# Patient Record
Sex: MALE | Race: WHITE
[De-identification: names, ages, dates, MRNs, and addresses within clinical notes are randomized per-mention and may not be internally consistent; named-entity substitution may affect disease eponyms.]

---

## 2019-05-30 ENCOUNTER — HOSPITAL ENCOUNTER (INPATIENT)
Dept: HOSPITAL 47 - EC | Age: 69
LOS: 6 days | Discharge: ANOTHER HOSPITAL | DRG: 659 | End: 2019-06-05
Payer: MEDICARE

## 2019-05-30 VITALS — BODY MASS INDEX: 17.4 KG/M2

## 2019-05-30 DIAGNOSIS — A41.9: ICD-10-CM

## 2019-05-30 DIAGNOSIS — J44.9: ICD-10-CM

## 2019-05-30 DIAGNOSIS — M19.90: ICD-10-CM

## 2019-05-30 DIAGNOSIS — E87.5: ICD-10-CM

## 2019-05-30 DIAGNOSIS — N18.4: ICD-10-CM

## 2019-05-30 DIAGNOSIS — N32.3: ICD-10-CM

## 2019-05-30 DIAGNOSIS — J96.12: ICD-10-CM

## 2019-05-30 DIAGNOSIS — J96.11: ICD-10-CM

## 2019-05-30 DIAGNOSIS — Z79.899: ICD-10-CM

## 2019-05-30 DIAGNOSIS — Z88.0: ICD-10-CM

## 2019-05-30 DIAGNOSIS — N17.0: ICD-10-CM

## 2019-05-30 DIAGNOSIS — F79: ICD-10-CM

## 2019-05-30 DIAGNOSIS — E87.2: ICD-10-CM

## 2019-05-30 DIAGNOSIS — N13.2: Primary | ICD-10-CM

## 2019-05-30 DIAGNOSIS — Q67.5: ICD-10-CM

## 2019-05-30 DIAGNOSIS — Z87.442: ICD-10-CM

## 2019-05-30 DIAGNOSIS — Z88.5: ICD-10-CM

## 2019-05-30 DIAGNOSIS — I46.9: ICD-10-CM

## 2019-05-30 DIAGNOSIS — J69.0: ICD-10-CM

## 2019-05-30 PROCEDURE — 96374 THER/PROPH/DIAG INJ IV PUSH: CPT

## 2019-05-30 PROCEDURE — 80053 COMPREHEN METABOLIC PANEL: CPT

## 2019-05-30 PROCEDURE — 84132 ASSAY OF SERUM POTASSIUM: CPT

## 2019-05-30 PROCEDURE — 94760 N-INVAS EAR/PLS OXIMETRY 1: CPT

## 2019-05-30 PROCEDURE — 87086 URINE CULTURE/COLONY COUNT: CPT

## 2019-05-30 PROCEDURE — 99285 EMERGENCY DEPT VISIT HI MDM: CPT

## 2019-05-30 PROCEDURE — 76770 US EXAM ABDO BACK WALL COMP: CPT

## 2019-05-30 PROCEDURE — 71045 X-RAY EXAM CHEST 1 VIEW: CPT

## 2019-05-30 PROCEDURE — 85025 COMPLETE CBC W/AUTO DIFF WBC: CPT

## 2019-05-30 PROCEDURE — 74420 UROGRAPHY RTRGR +-KUB: CPT

## 2019-05-30 PROCEDURE — 82553 CREATINE MB FRACTION: CPT

## 2019-05-30 PROCEDURE — 82805 BLOOD GASES W/O2 SATURATION: CPT

## 2019-05-30 PROCEDURE — 84100 ASSAY OF PHOSPHORUS: CPT

## 2019-05-30 PROCEDURE — 82365 CALCULUS SPECTROSCOPY: CPT

## 2019-05-30 PROCEDURE — 96375 TX/PRO/DX INJ NEW DRUG ADDON: CPT

## 2019-05-30 PROCEDURE — 81001 URINALYSIS AUTO W/SCOPE: CPT

## 2019-05-30 PROCEDURE — 84484 ASSAY OF TROPONIN QUANT: CPT

## 2019-05-30 PROCEDURE — 85610 PROTHROMBIN TIME: CPT

## 2019-05-30 PROCEDURE — 36600 WITHDRAWAL OF ARTERIAL BLOOD: CPT

## 2019-05-30 PROCEDURE — 83735 ASSAY OF MAGNESIUM: CPT

## 2019-05-30 PROCEDURE — 94002 VENT MGMT INPAT INIT DAY: CPT

## 2019-05-30 PROCEDURE — 80048 BASIC METABOLIC PNL TOTAL CA: CPT

## 2019-05-30 PROCEDURE — 96361 HYDRATE IV INFUSION ADD-ON: CPT

## 2019-05-30 PROCEDURE — 93005 ELECTROCARDIOGRAM TRACING: CPT

## 2019-05-30 PROCEDURE — 85730 THROMBOPLASTIN TIME PARTIAL: CPT

## 2019-05-30 PROCEDURE — 74018 RADEX ABDOMEN 1 VIEW: CPT

## 2019-05-30 NOTE — ED
Abdominal Pain HPI





- General


Chief Complaint: Abdominal Pain


Stated Complaint: kidney stones


Time Seen by Provider: 05/30/19 19:39


Source: patient


Mode of arrival: wheelchair


Limitations: no limitations





- History of Present Illness


Initial Comments: 


68-year-old male patient presents to the emergency department today for 

evaluation of increased left flank pain and nausea.  Patient states he woke 

around 0500 this morning with severe sharp stabbing left flank pain.  Patient 

states that he was taken by ambulance to [facility], had labs and

CT scan performed and was diagnosed with kidney stone.  Patient states he was 

discharged back to the nursing facility where he resides, states that he was 

pain-free for several hours however the pain started again.  Patient states he 

was given Norco however did not seem to relieve his symptoms.  Patient comes in 

requesting fentanyl.  Patient states he is urinating without difficulty.  Denies

any hematuria.  Denies any fever or chills.  Patient states he did have a kidney

stone one time in the past in 1988. Patient denies any recent rash, shortness 

breath, chest pain, diarrhea, constipation, back pain, numbness, tingling, 

dizziness, weakness, headache, visual changes, or any other complaints.








- Related Data


                                Home Medications











 Medication  Instructions  Recorded  Confirmed


 


Acetaminophen [Tylenol] 650 mg PO Q6H PRN 05/30/19 05/30/19


 


Cyanocobalamin (Vitamin B-12) 1,000 mcg PO DAILY 05/30/19 05/30/19





[Vitamin B-12]   


 


Ferrous Sulfate [Iron (65  mg PO DAILY 05/30/19 05/30/19





Elemental)]   


 


Folic Acid 0.4 mg PO DAILY@0800 05/30/19 05/30/19


 


HYDROcodone/APAP 5-325MG [Norco 1 - 2 tab PO Q4H PRN 05/30/19 05/30/19





5-325]   


 


Ondansetron HCl [Zofran] 8 mg PO Q8H PRN 05/30/19 05/30/19


 


Tamsulosin [Flomax] 0.4 mg PO DAILY PRN 05/30/19 05/30/19








                                  Previous Rx's











 Medication  Instructions  Recorded


 


Ondansetron [Zofran ODT] 4 mg PO Q8HR PRN #10 tab 05/30/19











                                    Allergies











Allergy/AdvReac Type Severity Reaction Status Date / Time


 


Penicillins Allergy  Unknown Verified 05/30/19 19:47





   Childhood  


 


morphine AdvReac  Nausea & Verified 05/30/19 19:47





   Vomiting  














Review of Systems


ROS Statement: 


Those systems with pertinent positive or pertinent negative responses have been 

documented in the HPI.





ROS Other: All systems not noted in ROS Statement are negative.





Past Medical History


Additional Past Medical History / Comment(s): arthritis. scoliosis


History of Any Multi-Drug Resistant Organisms: None Reported


Past Surgical History: Hernia Repair


Past Psychological History: No Psychological Hx Reported


Smoking Status: Never smoker


Past Alcohol Use History: Occasional


Past Drug Use History: None Reported





General Exam


Limitations: no limitations


General appearance: alert, in no apparent distress, other (Physical well-

developed, well-nourished adult male patient who exhibits severe scoliosis.  

Vital signs upon presentation are temperature 98.2F, pulse 87, respirations 20,

blood pressure 109/59, pulse ox 90% on room air.)


Eye exam: Present: normal appearance, PERRL, EOMI.  Absent: scleral icterus, 

conjunctival injection, periorbital swelling


ENT exam: Present: normal exam, normal oropharynx, mucous membranes moist


Respiratory exam: Present: normal lung sounds bilaterally.  Absent: respiratory 

distress, wheezes, rales, rhonchi, stridor


Cardiovascular Exam: Present: regular rate, normal rhythm, normal heart sounds. 

Absent: systolic murmur, diastolic murmur, rubs, gallop, clicks


GI/Abdominal exam: Present: soft, normal bowel sounds.  Absent: distended, 

tenderness, guarding, rebound, rigid


Neurological exam: Present: alert, oriented X3, CN II-XII intact


Psychiatric exam: Present: normal affect, normal mood


Skin exam: Present: warm, dry, intact, normal color.  Absent: rash





Course


                                   Vital Signs











  05/30/19





  19:18


 


Temperature 98.0 F


 


Pulse Rate 87


 


Respiratory 20





Rate 


 


Blood Pressure 109/59


 


O2 Sat by Pulse 90 L





Oximetry 














Medical Decision Making





- Medical Decision Making


68-year-old male patient coming from [facility] for evaluation of intractable 

left flank pain.  Patient was diagnosed with kidney stone this morning.  Was 

seen and evaluated at [facility] and discharge.  Dr. Franco the

patient's physician wanted him transferred here for admission for intractable 

pain.  Patient was given medication in the emergency department, he does 

currently report improvement of symptoms.  He'll be admitted for observation.





Report was reviewed from [facility] urinalysis shows negative 

glucose, 1+ protein, negative bilirubin, less than 2 urobilinogen, 5 pH, 3+ 

blood, negative ketones, negative nitrite, negative leukocyte esterase, cloudy 

clarity, normal specific gravity, and yellow color.  Patient did have greater 

than 20 red blood cells.  White blood cell count is 12.1.  BUN 25, creatinine 

1.4.  CT of the abdomen and pelvis was obtained.  Showed an obstructing 4 mm 

proximal left ureter calculus causing mild to moderate left-sided 

hydronephrosis.














Disposition


Clinical Impression: 


 Kidney stone on left side





Disposition: ADMITTED AS IP TO THIS [facility]


Condition: Serious


Additional Instructions: 


Increase fluids.  Continue medications as directed.  Follow-up with the primary 

care physician for recheck in 1-2 days.  Return to the emergency department 

immediately for any new, worsening, or concerning symptoms.


Prescriptions: 


Ondansetron [Zofran ODT] 4 mg PO Q8HR PRN #10 tab


 PRN Reason: Nausea


Referrals: 


Frank Franco MD [Primary Care Provider] - 1-2 days


Decision to Admit Reason: Admit from EC


Decision Date: 05/30/19


Decision Time: 21:20

## 2019-05-31 LAB
ALBUMIN SERPL-MCNC: 3.6 G/DL (ref 3.5–5)
ALP SERPL-CCNC: 65 U/L (ref 38–126)
ALT SERPL-CCNC: 19 U/L (ref 21–72)
ANION GAP SERPL CALC-SCNC: 7 MMOL/L
AST SERPL-CCNC: 19 U/L (ref 17–59)
BUN SERPL-SCNC: 40 MG/DL (ref 9–20)
CALCIUM SPEC-MCNC: 8.7 MG/DL (ref 8.4–10.2)
CHLORIDE SERPL-SCNC: 108 MMOL/L (ref 98–107)
CO2 SERPL-SCNC: 28 MMOL/L (ref 22–30)
GLUCOSE SERPL-MCNC: 104 MG/DL (ref 74–99)
PH UR: 5 [PH] (ref 5–8)
POTASSIUM SERPL-SCNC: 5 MMOL/L (ref 3.5–5.1)
PROT SERPL-MCNC: 6.1 G/DL (ref 6.3–8.2)
RBC UR QL: >182 /HPF (ref 0–5)
SODIUM SERPL-SCNC: 143 MMOL/L (ref 137–145)
SP GR UR: 1.01 (ref 1–1.03)
SQUAMOUS UR QL AUTO: 1 /HPF (ref 0–4)
UROBILINOGEN UR QL STRIP: <2 MG/DL (ref ?–2)
WBC #/AREA URNS HPF: 26 /HPF (ref 0–5)

## 2019-05-31 RX ADMIN — CYANOCOBALAMIN TAB 500 MCG SCH MCG: 500 TAB at 08:14

## 2019-05-31 RX ADMIN — TAMSULOSIN HYDROCHLORIDE PRN MG: 0.4 CAPSULE ORAL at 08:14

## 2019-05-31 RX ADMIN — Medication SCH MG: at 08:14

## 2019-05-31 RX ADMIN — FOLIC ACID SCH MG: 1 TABLET ORAL at 08:14

## 2019-05-31 RX ADMIN — CEFAZOLIN SCH: 330 INJECTION, POWDER, FOR SOLUTION INTRAMUSCULAR; INTRAVENOUS at 00:15

## 2019-05-31 RX ADMIN — CEFAZOLIN SCH: 330 INJECTION, POWDER, FOR SOLUTION INTRAMUSCULAR; INTRAVENOUS at 08:27

## 2019-05-31 NOTE — US
EXAMINATION TYPE: US kidneys/renal and bladder

 

DATE OF EXAM: 5/31/2019

 

COMPARISON: KUB

 

CLINICAL HISTORY: elroy.; marked underlying rotary scoliosis distorting normal anatomy making evaluatio
n suboptimal; left ureteral calculus per KUB today; patient sat upright for exam per his verbal prefe
rence.

 

EXAM MEASUREMENTS:

 

Right Kidney:  assessed in multiple views and not seen due to above clinical history

Left Kidney: 9.8 x 6.7 x 6.0 cm

 

 

 

Right Kidney: not seen due to anatomical limitations and rib shadowing  

Left Kidney: mild hydronephrosis, mid pole hyperechoic foci with posterior shadowing = 0.7 x 1.0 x 0.
3cm; mid pole cortical cyst = 0.8 x 0.7 x 0.8cm   

Bladder: not assessed as patient unable to lay supinely

 

 

 

 

IMPRESSION:

Right kidney is not identified. Left kidney shows some hydronephrosis and calcification that could re
late to obstruction at the ureteropelvic junction. Urinary bladder not evaluated.

## 2019-05-31 NOTE — XR
EXAMINATION TYPE: XR KUB

 

DATE OF EXAM: 5/31/2019 1:53 PM

 

CLINICAL HISTORY:  Left-sided kidney pain.

 

TECHNIQUE: Single supine KUB image of the abdomen is obtained.

 

COMPARISON: None.

 

FINDINGS: There is marked underlying rotary scoliosis distorting normal anatomy making evaluation sub
optimal. Rounded densities in the pelvis favor bilateral phleboliths. There is 4 mm round density samantha
r level of left superior sacroiliac joint could reflect mid ureter calculus.

 

There is overall nonobstructive bowel gas pattern. Visualized lung bases are clear.

 

IMPRESSION: Possible 4 mm mid left ureter calculus.

## 2019-05-31 NOTE — HP
HISTORY AND PHYSICAL



Dr. Kolb is covering for Dr. Frank Franco.



DATE OF SERVICE:

2019



HISTORY OF PRESENT ILLNESS:

Patient is a 68-year-old male who started to experience worsening left flank pain with

associated nausea yesterday at the nursing home where he resides, which is [facility].  The patient was taken to [facility] and evaluated in the

emergency room and discharged.  Patient continued to experience this extreme pain and

was told he had a slight fever when he was at [facility].  The patient contacted his

primary care physician and was told to go to [facility] Emergency Room to be admitted with

an acute kidney stone.



PAST MEDICAL HISTORY:

Past medical history is significant for arthritis and severe scoliosis.



PAST SURGICAL HISTORY:

Past surgical history is significant for hernia repair and tonsillectomy.



ALLERGIES:

ALLERGIES INCLUDE PENICILLIN and MORPHINE.



MEDICATIONS:

Medications patient is on at [facility] include:

1. Tylenol 650 mg p.o. q.6 hours p.r.n.

2. Vitamin B12 1000 mcg p.o. daily.

3. Iron 325 mg p.o. daily.

4. Folic acid 0.4 mg p.o. daily.

5. Norco 5/325 one to two tablets p.o. q.4 hours p.r.n.

6. Zofran 8 mg p.o. q.8 hours p.r.n.

7. Flomax 0.4 mg p.o. daily.



FAMILY HISTORY:

Father  in his 80s of some type of complications.  Mother  at the age of 91

from old age.



SOCIAL HISTORY:

No history of smoking.  Occasional alcohol, glass of wine. Patient did work in a

factory.



REVIEW OF SYSTEMS:

GENERAL: Negative for any fever.  Patient did complain of chills and did state that he

was told he had a slight fever at [facility].  Patient's weight is stable.

HEENT: Denies any headaches.  Denies any vision changes.  Does have some difficulty

hearing.  Denies any sore throat or difficulty swallowing.

RESPIRATORY: Positive for shortness of breath at times.  Patient states the biggest

thing is that he tires very easily.

CARDIOVASCULAR:  Negative for chest pain.

GI:  Negative for any stomach pain, although the patient does have significant lower

left quadrant pain that radiates up to the back at this time.

:  Again positive for kidney stone with significant pain on that left side.

ENDOCRINE: Negative for any diabetes mellitus or thyroid disease.

MUSCULOSKELETAL:  Positive for severe scoliosis.  NEUROLOGIC: Negative for seizures or

CVA.

PSYCHIATRIC:  Negative for anxiety.  Patient said a kidney stone is depressing in and

of itself.



PHYSICAL EXAMINATION:

GENERAL: Pleasant 68-year-old male who is sitting up at the bedside and does exhibit

severe scoliosis.

VITAL SIGNS: Temperature is 98.5, heart rate 86, respiratory rate 18, blood pressure

122/71. Oxygen saturation is 95% on nasal cannula at 2 L.

HEENT: Head is normocephalic, atraumatic.  Patient would not let me assess his pupils.

Ears and nose: No discharge was noted.  Mouth: Very difficult for patient to open up

his mouth.  Mucous membranes appear moist.  The patient does have poor dental hygiene.

LUNGS:  Sounds diminished.

HEART:  S1 and S2 are heard.  Not tachycardic.

ABDOMEN:  Soft.  Pain on that left side.

EXTREMITIES:  No edema.

NEUROLOGIC: Patient is awake, alert, oriented.



LABS:

Sodium is 143, potassium 5.0, chloride 108. CO2 is 28. Anion gap is 7. BUN is 40,

creatinine 2.94. Glucose is 104, calcium 8.7, total bilirubin 0.6, AST 19, ALT 19,

alkaline phosphatase 65, total protein 6.1, albumin 3.6.  Urinalysis: trace protein,

moderate blood, moderate leukocyte esterase, greater than 182 red blood cells, 26 white

blood cells.



IMAGING:

Ultrasound of the kidneys and bladder: Right kidney is not identified.  Left kidney

shows some hydronephrosis and calcification that could relate to obstruction at the

ureteropelvic junction.  Urinary bladder is not evaluated. KUB shows a possible 4 mm

mid left ureter calculus.



IMPRESSION:

1. Left ureter calculus.

2. Left hydronephrosis.

3. Acute kidney injury.



PLAN:

Consult Urology. Consult Nephrology. GI and DVT prophylaxis. Pain control.  Home

medications.



Again, this patient is being seen by Dr. Aicha Kolb covering for Dr. Frank Franco.





MMODL / IJN: [id number] / Job#: [id number]

## 2019-05-31 NOTE — P.NPCON
History of Present Illness





- Reason for Consult


acute renal failure





- History of Present Illness





Reason for consultation: Acute kidney injury





History of present illness:


Patient is a 68-year-old male seen in consultation for acute kidney injury.  

Unclear as to what his baseline renal function is.  Creatinine on admission was 

2.94.  From the ER notes it appears patient's creatinine was 1.4 when he was at 

[facility] yesterday.  Patient states he went to [facility] yesterday 

due to flank pain.  Patient was noted to have kidney stones.  He was given pain 

medication and subsequently discharged back to [facility].  However

yesterday evening he developed recurrent flank pain and was brought to [facility].  Patient had a KUB x-ray done this morning which revealed a

possible 4 mm left ureteral calculus.  His pain is better controlled.  Patient 

of  good urine output.  No hematuria or dysuria.  Patient had an episode of 

vomiting last night but none today.  Oral intake is good.  He is maintained on 

IV fluids.  Patient denies use of nonsteroidals.  He denies any family history 

of renal disease.  Hemodynamically stable.  No fever or chills.  





Vital signs are stable.


General: The patient appeared well nourished and normally developed. 


HEENT: Head exam is unremarkable. Neck is without jugular venous distension.


LUNGS: Breath sounds decreased.


HEART: Rate and Rhythm are regular. First and second heart sounds normal. No 

murmurs, rubs or gallops. 


ABDOMEN: Abdominal exam reveals normal bowel sounds. Non-tender and non-

distended. No evidence of peritonitis.


EXTREMITITES: No clubbing, cyanosis, or edema.





Past Medical History


Additional Past Medical History / Comment(s): arthritis. scoliosis


History of Any Multi-Drug Resistant Organisms: None Reported


Past Surgical History: Hernia Repair


Past Psychological History: No Psychological Hx Reported


Smoking Status: Never smoker


Past Alcohol Use History: Occasional


Past Drug Use History: None Reported





- Past Family History


  ** Mother


History Unknown: Yes





Medications and Allergies


                                Home Medications











 Medication  Instructions  Recorded  Confirmed  Type


 


Acetaminophen [Tylenol] 650 mg PO Q6H PRN 05/30/19 05/30/19 History


 


Cyanocobalamin (Vitamin B-12) 1,000 mcg PO DAILY 05/30/19 05/30/19 History





[Vitamin B-12]    


 


Ferrous Sulfate [Iron (65  mg PO DAILY 05/30/19 05/30/19 History





Elemental)]    


 


Folic Acid 0.4 mg PO DAILY@0800 05/30/19 05/30/19 History


 


HYDROcodone/APAP 5-325MG [Norco 1 - 2 tab PO Q4H PRN 05/30/19 05/30/19 History





5-325]    


 


Ondansetron HCl [Zofran] 8 mg PO Q8H PRN 05/30/19 05/30/19 History


 


Ondansetron [Zofran ODT] 4 mg PO Q8HR PRN #10 tab 05/30/19  Rx


 


Tamsulosin [Flomax] 0.4 mg PO DAILY PRN 05/30/19 05/30/19 History








                                    Allergies











Allergy/AdvReac Type Severity Reaction Status Date / Time


 


Penicillins Allergy  Unknown Verified 05/30/19 19:47





   Childhood  


 


morphine AdvReac  Nausea & Verified 05/30/19 19:47





   Vomiting  














Physical Exam


Vitals: 


                                   Vital Signs











  Temp Pulse Pulse Resp BP BP BP


 


 05/31/19 05:10  98.4 F   85  20   115/68 


 


 05/30/19 23:00  98.3 F   88  20    144/66


 


 05/30/19 22:34   87   18  139/67  


 


 05/30/19 19:18  98.0 F  87   20  109/59  














  Pulse Ox


 


 05/31/19 05:10  94 L


 


 05/30/19 23:00  94 L


 


 05/30/19 22:34  91 L


 


 05/30/19 19:18  90 L








                                Intake and Output











 05/30/19 05/31/19 05/31/19





 22:59 06:59 14:59


 


Intake Total  200 200


 


Output Total  75 


 


Balance  125 200


 


Intake:   


 


  Oral  200 200


 


Output:   


 


  Urine  75 


 


Other:   


 


  # Voids  1 1


 


  Weight 43.091 kg  43.091 kg














Results





- Lab Results


                             Most recent lab results











Calcium  8.7 mg/dL (8.4-10.2)   05/31/19  09:36    














                                 05/31/19 09:36





Assessment and Plan


Plan: 





Assessment:


1.  Acute kidney injury secondary to ATN secondary to infection.  Rule out 

obstructive uropathy.  Patient's creatinine today was 2.94.  From the ER note, 

it appears patient's creatinine was 1.4 yesterday when he was at [facility].  No other

 records available.


2.  Left-sided nephrolithiasis.


3.  Pyuria.


4.  Congenital spinal deformity.





Plan:


Maintain normal saline at 75 mL an hour.


Check renal ultrasound.


Check urine culture.


Repeat electrolytes in the morning.


Urology also consulted.


Avoid nephrotoxins.





Thank you for the consultation. I will continue to follow the patient with you 

during his hospital stay.

## 2019-05-31 NOTE — P.GSCN
History of Present Illness


Consult date: 05/31/19


History of present illness: 





The patient is a 68-year-old gentleman who comes from a nursing home with left 

ureteral colic due to a kidney stone.  He was in [facility] 

recently and was identified on CAT scan today to 4 mm upper ureteral stone on 

the left.  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  The 

patient is denying pain.  He states that he had stones many years ago that 

passed spontaneously.  He's been unaware of any other stones.  There was 

question whether the urine is infected however to me it does not look like it.  

He does not have any clinical evidence of infection at this point in time.  He 

is afebrile and his white blood cell count is normal.  The patient does not have

a history of urinary infections.  His creatinine has gone from 1.4 to 2.8.





Review of Systems





The patient is not interested in answering any questions





Past Medical History


Additional Past Medical History / Comment(s): arthritis. scoliosis


History of Any Multi-Drug Resistant Organisms: None Reported


Past Surgical History: Hernia Repair


Past Psychological History: No Psychological Hx Reported


Smoking Status: Never smoker


Past Alcohol Use History: Occasional


Past Drug Use History: None Reported





- Past Family History


  ** Mother


History Unknown: Yes





Medications and Allergies


                                Home Medications











 Medication  Instructions  Recorded  Confirmed  Type


 


Acetaminophen [Tylenol] 650 mg PO Q6H PRN 05/30/19 05/30/19 History


 


Cyanocobalamin (Vitamin B-12) 1,000 mcg PO DAILY 05/30/19 05/30/19 History





[Vitamin B-12]    


 


Ferrous Sulfate [Iron (65  mg PO DAILY 05/30/19 05/30/19 History





Elemental)]    


 


Folic Acid 0.4 mg PO DAILY@0800 05/30/19 05/30/19 History


 


HYDROcodone/APAP 5-325MG [Norco 1 - 2 tab PO Q4H PRN 05/30/19 05/30/19 History





5-325]    


 


Ondansetron HCl [Zofran] 8 mg PO Q8H PRN 05/30/19 05/30/19 History


 


Ondansetron [Zofran ODT] 4 mg PO Q8HR PRN #10 tab 05/30/19  Rx


 


Tamsulosin [Flomax] 0.4 mg PO DAILY PRN 05/30/19 05/30/19 History








                                    Allergies











Allergy/AdvReac Type Severity Reaction Status Date / Time


 


Penicillins Allergy  Unknown Verified 05/30/19 19:47





   Childhood  


 


morphine AdvReac  Nausea & Verified 05/30/19 19:47





   Vomiting  














Surgical - Exam


                                   Vital Signs











Temp Pulse Resp BP Pulse Ox


 


 98.0 F   87   20   109/59   90 L


 


 05/30/19 19:18  05/30/19 19:18  05/30/19 19:18  05/30/19 19:18  05/30/19 19:18














- General





The patient has marked kyphoscoliosis.  He is difficult to communicate with.  He

 is not interested in much of what I have to say.





- Eyes





Glasses


PERRL





- Respiratory





No significant difficulty breathing marked barrel chesting





- Musculoskeletal





Marked kyphoscoliosis.





- Psychiatric


oriented to time, oriented to person, oriented to place, speech is normal, 

memory intact





Results





- Labs





                                 05/31/19 09:36


                  Abnormal Lab Results - Last 24 Hours (Table)











  05/31/19 05/31/19 Range/Units





  09:36 12:45 


 


Chloride  108 H   ()  mmol/L


 


BUN  40 H   (9-20)  mg/dL


 


Creatinine  2.94 H   (0.66-1.25)  mg/dL


 


Glucose  104 H   (74-99)  mg/dL


 


ALT  19 L   (21-72)  U/L


 


Total Protein  6.1 L   (6.3-8.2)  g/dL


 


Urine Protein   Trace H  (Negative)  


 


Urine Blood   Moderate H  (Negative)  


 


Ur Leukocyte Esterase   Moderate H  (Negative)  


 


Urine RBC   >182 H  (0-5)  /hpf


 


Urine WBC   26 H  (0-5)  /hpf


 


Amorphous Sediment   Rare H  (None)  /hpf


 


Urine Bacteria   Rare H  (None)  /hpf


 


Urine Mucus   Rare H  (None)  /hpf








                                 Diabetes panel











  05/31/19 Range/Units





  09:36 


 


Sodium  143  (137-145)  mmol/L


 


Potassium  5.0  (3.5-5.1)  mmol/L


 


Chloride  108 H  ()  mmol/L


 


Carbon Dioxide  28  (22-30)  mmol/L


 


BUN  40 H  (9-20)  mg/dL


 


Creatinine  2.94 H  (0.66-1.25)  mg/dL


 


Glucose  104 H  (74-99)  mg/dL


 


Calcium  8.7  (8.4-10.2)  mg/dL


 


AST  19  (17-59)  U/L


 


ALT  19 L  (21-72)  U/L


 


Alkaline Phosphatase  65  ()  U/L


 


Total Protein  6.1 L  (6.3-8.2)  g/dL


 


Albumin  3.6  (3.5-5.0)  g/dL








                                  Calcium panel











  05/31/19 Range/Units





  09:36 


 


Calcium  8.7  (8.4-10.2)  mg/dL


 


Albumin  3.6  (3.5-5.0)  g/dL








                                 Pituitary panel











  05/31/19 Range/Units





  09:36 


 


Sodium  143  (137-145)  mmol/L


 


Potassium  5.0  (3.5-5.1)  mmol/L


 


Chloride  108 H  ()  mmol/L


 


Carbon Dioxide  28  (22-30)  mmol/L


 


BUN  40 H  (9-20)  mg/dL


 


Creatinine  2.94 H  (0.66-1.25)  mg/dL


 


Glucose  104 H  (74-99)  mg/dL


 


Calcium  8.7  (8.4-10.2)  mg/dL








                                  Adrenal panel











  05/31/19 Range/Units





  09:36 


 


Sodium  143  (137-145)  mmol/L


 


Potassium  5.0  (3.5-5.1)  mmol/L


 


Chloride  108 H  ()  mmol/L


 


Carbon Dioxide  28  (22-30)  mmol/L


 


BUN  40 H  (9-20)  mg/dL


 


Creatinine  2.94 H  (0.66-1.25)  mg/dL


 


Glucose  104 H  (74-99)  mg/dL


 


Calcium  8.7  (8.4-10.2)  mg/dL


 


Total Bilirubin  0.6  (0.2-1.3)  mg/dL


 


AST  19  (17-59)  U/L


 


ALT  19 L  (21-72)  U/L


 


Alkaline Phosphatase  65  ()  U/L


 


Total Protein  6.1 L  (6.3-8.2)  g/dL


 


Albumin  3.6  (3.5-5.0)  g/dL














- Imaging


Abdominal x-ray: report reviewed, image reviewed


CT scan - abdomen: report reviewed, image reviewed


CT scan - pelvis: report reviewed, image reviewed


US - abdomen: report reviewed, image reviewed





Assessment and Plan


Assessment: 





Impression: Left ureteral calculus, asymptomatic.  Left hydronephrosis with 

renal insufficiency probably secondary to prerenal as well as obstructive 

causes.  Severe COPD with marked kyphoscoliosis, congenital





Recommendations: I reviewed the computed tomography scan from [facility] as well as the ultrasound and KUB here.  I suspect the renal 

insufficiency is a combination of decreased fluid intake as well as obstruction.

  How much of this is due to chronic renal insufficiency is indeterminate.  The 

patient is hydronephrotic on the left side.  The stone was 4 mm in the proximal 

ureter.  I tried to described the patient is status of his ureter but he states 

that he is not interested in anything being done.  I explained to him that he 

could pass it spontaneously but he may not.  I explained to the patient 

indications for surgery but he was not interested in listening to these.  He 

states that he is not interested in having any surgery whatsoever.





If further urologic consultation is requested please fill free to contact us.  

At this point in time due to the patient's lack of desire of having any 

potential urologic intervention in the future I will sign off the case.

## 2019-06-01 LAB
ALBUMIN SERPL-MCNC: 3.6 G/DL (ref 3.5–5)
ALP SERPL-CCNC: 67 U/L (ref 38–126)
ALT SERPL-CCNC: 18 U/L (ref 21–72)
ANION GAP SERPL CALC-SCNC: 9 MMOL/L
AST SERPL-CCNC: 18 U/L (ref 17–59)
BASOPHILS # BLD AUTO: 0.1 K/UL (ref 0–0.2)
BASOPHILS NFR BLD AUTO: 1 %
BUN SERPL-SCNC: 54 MG/DL (ref 9–20)
CALCIUM SPEC-MCNC: 8.2 MG/DL (ref 8.4–10.2)
CHLORIDE SERPL-SCNC: 108 MMOL/L (ref 98–107)
CO2 BLDA-SCNC: 25 MMOL/L (ref 19–24)
CO2 SERPL-SCNC: 24 MMOL/L (ref 22–30)
EOSINOPHIL # BLD AUTO: 0.2 K/UL (ref 0–0.7)
EOSINOPHIL NFR BLD AUTO: 3 %
ERYTHROCYTE [DISTWIDTH] IN BLOOD BY AUTOMATED COUNT: 5.02 M/UL (ref 4.3–5.9)
ERYTHROCYTE [DISTWIDTH] IN BLOOD: 15.9 % (ref 11.5–15.5)
GLUCOSE SERPL-MCNC: 96 MG/DL (ref 74–99)
HCO3 BLDA-SCNC: 23 MMOL/L (ref 21–25)
HCT VFR BLD AUTO: 34.8 % (ref 39–53)
HGB BLD-MCNC: 9.9 GM/DL (ref 13–17.5)
LYMPHOCYTES # SPEC AUTO: 0.9 K/UL (ref 1–4.8)
LYMPHOCYTES NFR SPEC AUTO: 15 %
MAGNESIUM SPEC-SCNC: 2.1 MG/DL (ref 1.6–2.3)
MCH RBC QN AUTO: 19.7 PG (ref 25–35)
MCHC RBC AUTO-ENTMCNC: 28.4 G/DL (ref 31–37)
MCV RBC AUTO: 69.4 FL (ref 80–100)
MONOCYTES # BLD AUTO: 0.4 K/UL (ref 0–1)
MONOCYTES NFR BLD AUTO: 6 %
NEUTROPHILS # BLD AUTO: 4.8 K/UL (ref 1.3–7.7)
NEUTROPHILS NFR BLD AUTO: 75 %
PCO2 BLDA: 52 MMHG (ref 35–45)
PH BLDA: 7.25 [PH] (ref 7.35–7.45)
PLATELET # BLD AUTO: 165 K/UL (ref 150–450)
PO2 BLDA: 66 MMHG (ref 83–108)
POTASSIUM SERPL-SCNC: 4.9 MMOL/L (ref 3.5–5.1)
PROT SERPL-MCNC: 6.3 G/DL (ref 6.3–8.2)
SODIUM SERPL-SCNC: 141 MMOL/L (ref 137–145)
WBC # BLD AUTO: 6.4 K/UL (ref 3.8–10.6)

## 2019-06-01 PROCEDURE — 5A09457 ASSISTANCE WITH RESPIRATORY VENTILATION, 24-96 CONSECUTIVE HOURS, CONTINUOUS POSITIVE AIRWAY PRESSURE: ICD-10-PCS

## 2019-06-01 RX ADMIN — CEFAZOLIN SCH MLS/HR: 330 INJECTION, POWDER, FOR SOLUTION INTRAMUSCULAR; INTRAVENOUS at 05:45

## 2019-06-01 RX ADMIN — PANTOPRAZOLE SODIUM SCH MG: 40 TABLET, DELAYED RELEASE ORAL at 08:30

## 2019-06-01 RX ADMIN — FOLIC ACID SCH MG: 1 TABLET ORAL at 08:30

## 2019-06-01 RX ADMIN — TAMSULOSIN HYDROCHLORIDE PRN MG: 0.4 CAPSULE ORAL at 08:57

## 2019-06-01 RX ADMIN — Medication SCH MG: at 08:30

## 2019-06-01 RX ADMIN — CYANOCOBALAMIN TAB 500 MCG SCH MCG: 500 TAB at 08:30

## 2019-06-01 RX ADMIN — CEFAZOLIN SCH MLS/HR: 330 INJECTION, POWDER, FOR SOLUTION INTRAMUSCULAR; INTRAVENOUS at 13:20

## 2019-06-01 NOTE — P.CNPUL
History of Present Illness


Consult date: 06/01/19


Reason for consult: dyspnea, cough, hypoxemia


Chief complaint: Renal calculi and hypoxia


History of present illness: 





This is a 68-year-old male seen and evaluated examined in the medical floor this

patient is admitted to hospital with left ureteric calculi she was identified as

having left ureteral 4 mm stones off note that patient has severe extensive 

kyphoscoliosis he have borderline shortness of breath patient is currently 

denies any shortness of breath or chest pain symptoms improve however her room 

air saturation was just 93%, he require 2 L oxygen denies any cough or sputum 

production denies any chest pain denies any shortness of breath at this time





Review of Systems


All systems: negative





Past Medical History


Additional Past Medical History / Comment(s): arthritis. scoliosis


History of Any Multi-Drug Resistant Organisms: None Reported


Past Surgical History: Hernia Repair


Past Psychological History: No Psychological Hx Reported


Smoking Status: Never smoker


Past Alcohol Use History: Occasional


Past Drug Use History: None Reported





- Past Family History


  ** Mother


History Unknown: Yes





Medications and Allergies


                                Home Medications











 Medication  Instructions  Recorded  Confirmed  Type


 


Acetaminophen [Tylenol] 650 mg PO Q6H PRN 05/30/19 05/30/19 History


 


Cyanocobalamin (Vitamin B-12) 1,000 mcg PO DAILY 05/30/19 05/30/19 History





[Vitamin B-12]    


 


Ferrous Sulfate [Iron (65  mg PO DAILY 05/30/19 05/30/19 History





Elemental)]    


 


Folic Acid 0.4 mg PO DAILY@0800 05/30/19 05/30/19 History


 


HYDROcodone/APAP 5-325MG [Norco 1 - 2 tab PO Q4H PRN 05/30/19 05/30/19 History





5-325]    


 


Ondansetron HCl [Zofran] 8 mg PO Q8H PRN 05/30/19 05/30/19 History


 


Ondansetron [Zofran ODT] 4 mg PO Q8HR PRN #10 tab 05/30/19  Rx


 


Tamsulosin [Flomax] 0.4 mg PO DAILY PRN 05/30/19 05/30/19 History








                                    Allergies











Allergy/AdvReac Type Severity Reaction Status Date / Time


 


Penicillins Allergy  Unknown Verified 05/30/19 19:47





   Childhood  


 


morphine AdvReac  Nausea & Verified 05/30/19 19:47





   Vomiting  














Physical Exam


Vitals: 


                                   Vital Signs











  Temp Pulse Resp BP Pulse Ox


 


 06/01/19 13:10      98


 


 06/01/19 13:04  98.8 F  90  18  121/65  87 L


 


 06/01/19 08:29   96   118/68  98


 


 06/01/19 08:00      83 L


 


 06/01/19 04:45  97.8 F  89  22  131/74  93 L


 


 05/31/19 23:38   84   


 


 05/31/19 21:05  97.9 F  84  20  117/51  91 L


 


 05/31/19 17:34      86 L








                                Intake and Output











 05/31/19 06/01/19 06/01/19





 22:59 06:59 14:59


 


Intake Total 100 100 


 


Output Total  75 0


 


Balance 100 25 0


 


Intake:   


 


  Oral 100 100 


 


Output:   


 


  Urine  75 


 


  Post Void Residual   0


 


Other:   


 


  # Voids 0 0 0














- Constitutional


General appearance: average body habitus, cooperative, no acute distress





- EENT


Eyes: EOMI, PERRLA


Ears: bilateral: normal





- Neck


Neck: normal ROM


Carotids: bilateral: upstroke normal





- Respiratory





Severe kyphoscoliosis present


Respiratory: bilateral: CTA





- Cardiovascular


Rhythm: regular


Heart sounds: normal: S1, S2





- Gastrointestinal


General gastrointestinal: normal bowel sounds





- Neurologic


Neurologic: CNII-XII intact





- Musculoskeletal


Musculoskeletal: generalized weakness, strength equal bilaterally





- Psychiatric


Psychiatric: A&O x's 3, appropriate affect, intact judgment & insight





Results





- Laboratory Findings


CBC and BMP: 


                                 06/01/19 11:09





                                 06/01/19 11:08


Abnormal lab findings: 


                                  Abnormal Labs











  05/31/19 05/31/19 06/01/19





  09:36 12:45 11:08


 


Hgb   


 


Hct   


 


MCV   


 


MCH   


 


MCHC   


 


RDW   


 


Lymphocytes #   


 


Chloride  108 H   108 H


 


BUN  40 H   54 H


 


Creatinine  2.94 H   5.74 H


 


Glucose  104 H  


 


Calcium    8.2 L


 


ALT  19 L   18 L


 


Total Protein  6.1 L  


 


Urine Protein   Trace H 


 


Urine Blood   Moderate H 


 


Ur Leukocyte Esterase   Moderate H 


 


Urine RBC   >182 H 


 


Urine WBC   26 H 


 


Amorphous Sediment   Rare H 


 


Urine Bacteria   Rare H 


 


Urine Mucus   Rare H 














  06/01/19





  11:09


 


Hgb  9.9 L


 


Hct  34.8 L


 


MCV  69.4 L


 


MCH  19.7 L


 


MCHC  28.4 L


 


RDW  15.9 H


 


Lymphocytes #  0.9 L


 


Chloride 


 


BUN 


 


Creatinine 


 


Glucose 


 


Calcium 


 


ALT 


 


Total Protein 


 


Urine Protein 


 


Urine Blood 


 


Ur Leukocyte Esterase 


 


Urine RBC 


 


Urine WBC 


 


Amorphous Sediment 


 


Urine Bacteria 


 


Urine Mucus 














- Diagnostic Findings


Chest x-ray: report reviewed, image reviewed (Lungs are clear severe level 

scoliosis present)





Assessment and Plan


Assessment: 





Hypoxia related to hypoventilation





Renal calculi left





Acute renal failure related to above





Left renal hydronephrosis





Suspect chronic hypoxia and chronic hypercapnia likely chronic hypercapnic 

hypoxic respiratory failure


Plan: 





Check arterial blood gases at supplemental oxygen





For now keep supplemental oxygen





May need BiPAP machine or noninvasive ventilator as outpatient


Time with Patient: Greater than 30

## 2019-06-01 NOTE — P.PN
Subjective


Progress Note Date: 06/01/19





The patient's renal failure has progressed.  His creatinine is over 5.  I 

suspect his right kidney is not functioning well and with the left obstructed 

this is the reason the creatinine is rising.  I discussed with the patient the 

diagnosis and the treatment requirement of a double-J catheter.  He seemed to 

understand.  We've consult with the guardian and they understand the need for 

the placement of the double-J catheter to relieve the renal failure.  This is 

planned for tomorrow.





Objective





- Vital Signs


Vital signs: 


                                   Vital Signs











Temp  98.8 F   06/01/19 13:04


 


Pulse  90   06/01/19 13:04


 


Resp  18   06/01/19 13:04


 


BP  121/65   06/01/19 13:04


 


Pulse Ox  98   06/01/19 13:10








                                 Intake & Output











 05/31/19 06/01/19 06/01/19





 18:59 06:59 18:59


 


Intake Total 200 200 


 


Output Total  75 0


 


Balance 200 125 0


 


Weight 43.091 kg  


 


Intake:   


 


  Oral 200 200 


 


Output:   


 


  Urine  75 


 


  Post Void Residual   0


 


Other:   


 


  # Voids 1 0 0














- Labs


CBC & Chem 7: 


                                 06/01/19 11:09





                                 06/01/19 11:08


Labs: 


                  Abnormal Lab Results - Last 24 Hours (Table)











  06/01/19 06/01/19 06/01/19 Range/Units





  11:08 11:09 14:39 


 


Hgb   9.9 L   (13.0-17.5)  gm/dL


 


Hct   34.8 L   (39.0-53.0)  %


 


MCV   69.4 L   (80.0-100.0)  fL


 


MCH   19.7 L   (25.0-35.0)  pg


 


MCHC   28.4 L   (31.0-37.0)  g/dL


 


RDW   15.9 H   (11.5-15.5)  %


 


Lymphocytes #   0.9 L   (1.0-4.8)  k/uL


 


ABG pH    7.25 L  (7.35-7.45)  


 


ABG pCO2    52 H  (35-45)  mmHg


 


ABG pO2    66 L  ()  mmHg


 


ABG Total CO2    25 H  (19-24)  mmol/L


 


ABG O2 Saturation    89.8 L  (94-97)  %


 


Chloride  108 H    ()  mmol/L


 


BUN  54 H    (9-20)  mg/dL


 


Creatinine  5.74 H    (0.66-1.25)  mg/dL


 


Calcium  8.2 L    (8.4-10.2)  mg/dL


 


ALT  18 L    (21-72)  U/L








                      Microbiology - Last 24 Hours (Table)











 05/31/19 12:45 Urine Culture - Preliminary





 Urine,Voided

## 2019-06-01 NOTE — PN
PROGRESS NOTE



The patient is seen for followup for acute kidney injury.  He was admitted with a serum

creatinine of 2.9 mg/dL.  We do not have any prior previous labs available for

comparison.  The patient is maintained on IV fluids.  The ultrasound of the kidneys

yesterday does show evidence of left kidney hydronephrosis and possible UPJ junction

obstruction.  The right kidney could not be usual visualized.  The KUB shows a 4 mm mid

left ureteral calculus.  The patient was seen by Urology.  However, at that time, the

patient did not want to consider any surgical intervention.  This morning, serum

creatinine is up to 5.7.  The patient has had decreased urine output.  He is maintained

on IV fluids.  There are no other nephrotoxic medications on board at this time.  Blood

pressure is not significantly low.



PHYSICAL EXAMINATION:

This morning blood pressure was 118/68, heart rate 96 per minute.  He is afebrile.

Examination of the heart S1, S2.  Examination lungs bilateral breath sounds are heard.

Severe scoliosis is noted.  Examination of the lower extremities shows no significant

edema.  CNS exam shows patient is moving all 4 extremities.  He has severe scoliosis.



LABS:

Show sodium 141, potassium 4.9, BUN 54, serum creatinine 5.74.  UA shows more than 182

RBCs, WBCs about 26, moderate blood and trace protein.



ASSESSMENT:

1. Acute kidney injury, most likely obstructive uropathy with evidence of left

    hydronephrosis.  The right kidney was not visualized on the ultrasound.  Urology

    will be contacted.  In the meantime, I will increase the IV fluids to 100 mL an

    hour.

2. Left ureteral stone with left hydronephrosis.

3. Severe scoliosis.

4. Rule out urinary tract infection.  Urine culture is pending.



PLAN:

Increase IV fluids.  Contact Urology.  Continue to avoid nephrotoxic agents and repeat

labs.  Avoid hypotension.





MMODL / IJN: [id number] / Job#: [id number]

## 2019-06-01 NOTE — XR
EXAMINATION TYPE: XR chest 1V portable

 

DATE OF EXAM: 6/1/2019

 

HISTORY: r/o pneumonia, requiring O2 .

 

REFERENCE: NONE.

 

FINDINGS: There is a severe levoscoliosis. The heart does not appear enlarged. The lungs appear clear
. Pleural spaces are clear.

 

IMPRESSION: 

 

SEVERE LEVOSCOLIOSIS.

## 2019-06-01 NOTE — PN
PROGRESS NOTE



SUBJECTIVE:

This is a white male with dyspnea, cough, hypoxemia, urinary retention.  Continue

_____.  Continue current treatment.  Due to extensive kyphoscoliosis, urinary retention

and is maintaining oxygen is in 80s at which time, Pulmonary has been consulted.  Chest

x-ray has been ordered.  Urinary retention is being dealt with IV fluids.  Labs are

reviewed.



ASSESSMENT:

1. Left renal hydronephrosis.

2. Suspect chronic hypoxemia and hypercapnia due to chronic hypercapnic hypoxemic

    respiratory failure.

3. Possibly a BiPAP at home per Pulmonology.

4. Hypoxemia related to hypoventilation.

5. Renal calculi, left.

6. Acute renal failure secondary to above.

Await for multiple consultations, recommendations.  Keep him with fluid rehydration at

this time.  Monitor potassium levels.





MMODL / IJN: [id number] / Job#: [id number]

## 2019-06-02 LAB
ALBUMIN SERPL-MCNC: 3.2 G/DL (ref 3.5–5)
ALBUMIN SERPL-MCNC: 3.5 G/DL (ref 3.5–5)
ALP SERPL-CCNC: 66 U/L (ref 38–126)
ALP SERPL-CCNC: 72 U/L (ref 38–126)
ALT SERPL-CCNC: 18 U/L (ref 21–72)
ALT SERPL-CCNC: 19 U/L (ref 21–72)
ANION GAP SERPL CALC-SCNC: 12 MMOL/L
ANION GAP SERPL CALC-SCNC: 8 MMOL/L
AST SERPL-CCNC: 12 U/L (ref 17–59)
AST SERPL-CCNC: 16 U/L (ref 17–59)
BASOPHILS # BLD AUTO: 0 K/UL (ref 0–0.2)
BASOPHILS NFR BLD AUTO: 1 %
BUN SERPL-SCNC: 53 MG/DL (ref 9–20)
BUN SERPL-SCNC: 61 MG/DL (ref 9–20)
CALCIUM SPEC-MCNC: 8 MG/DL (ref 8.4–10.2)
CALCIUM SPEC-MCNC: 8.2 MG/DL (ref 8.4–10.2)
CHLORIDE SERPL-SCNC: 112 MMOL/L (ref 98–107)
CHLORIDE SERPL-SCNC: 113 MMOL/L (ref 98–107)
CO2 SERPL-SCNC: 18 MMOL/L (ref 22–30)
CO2 SERPL-SCNC: 18 MMOL/L (ref 22–30)
EOSINOPHIL # BLD AUTO: 0.2 K/UL (ref 0–0.7)
EOSINOPHIL NFR BLD AUTO: 4 %
ERYTHROCYTE [DISTWIDTH] IN BLOOD BY AUTOMATED COUNT: 4.54 M/UL (ref 4.3–5.9)
ERYTHROCYTE [DISTWIDTH] IN BLOOD: 16 % (ref 11.5–15.5)
GLUCOSE BLD-MCNC: 117 MG/DL (ref 75–99)
GLUCOSE SERPL-MCNC: 132 MG/DL (ref 74–99)
GLUCOSE SERPL-MCNC: 82 MG/DL (ref 74–99)
HCT VFR BLD AUTO: 31.6 % (ref 39–53)
HGB BLD-MCNC: 9.3 GM/DL (ref 13–17.5)
LYMPHOCYTES # SPEC AUTO: 0.7 K/UL (ref 1–4.8)
LYMPHOCYTES NFR SPEC AUTO: 12 %
MCH RBC QN AUTO: 20.5 PG (ref 25–35)
MCHC RBC AUTO-ENTMCNC: 29.4 G/DL (ref 31–37)
MCV RBC AUTO: 69.7 FL (ref 80–100)
MONOCYTES # BLD AUTO: 0.3 K/UL (ref 0–1)
MONOCYTES NFR BLD AUTO: 5 %
NEUTROPHILS # BLD AUTO: 4.6 K/UL (ref 1.3–7.7)
NEUTROPHILS NFR BLD AUTO: 77 %
PLATELET # BLD AUTO: 131 K/UL (ref 150–450)
POTASSIUM SERPL-SCNC: 4.9 MMOL/L (ref 3.5–5.1)
POTASSIUM SERPL-SCNC: 5.5 MMOL/L (ref 3.5–5.1)
PROT SERPL-MCNC: 5.7 G/DL (ref 6.3–8.2)
PROT SERPL-MCNC: 6.1 G/DL (ref 6.3–8.2)
SODIUM SERPL-SCNC: 139 MMOL/L (ref 137–145)
SODIUM SERPL-SCNC: 142 MMOL/L (ref 137–145)
WBC # BLD AUTO: 6 K/UL (ref 3.8–10.6)

## 2019-06-02 PROCEDURE — 0T778DZ DILATION OF LEFT URETER WITH INTRALUMINAL DEVICE, VIA NATURAL OR ARTIFICIAL OPENING ENDOSCOPIC: ICD-10-PCS

## 2019-06-02 PROCEDURE — BT1F1ZZ FLUOROSCOPY OF LEFT KIDNEY, URETER AND BLADDER USING LOW OSMOLAR CONTRAST: ICD-10-PCS

## 2019-06-02 RX ADMIN — CYANOCOBALAMIN TAB 500 MCG SCH: 500 TAB at 07:02

## 2019-06-02 RX ADMIN — Medication SCH: at 07:02

## 2019-06-02 RX ADMIN — CEFAZOLIN SCH MLS/HR: 330 INJECTION, POWDER, FOR SOLUTION INTRAMUSCULAR; INTRAVENOUS at 02:12

## 2019-06-02 RX ADMIN — CEFAZOLIN SCH MLS/HR: 330 INJECTION, POWDER, FOR SOLUTION INTRAMUSCULAR; INTRAVENOUS at 20:12

## 2019-06-02 RX ADMIN — ONDANSETRON PRN MG: 2 INJECTION INTRAMUSCULAR; INTRAVENOUS at 15:57

## 2019-06-02 RX ADMIN — CEFAZOLIN SCH MLS/HR: 330 INJECTION, POWDER, FOR SOLUTION INTRAMUSCULAR; INTRAVENOUS at 07:23

## 2019-06-02 RX ADMIN — PANTOPRAZOLE SODIUM SCH: 40 TABLET, DELAYED RELEASE ORAL at 07:02

## 2019-06-02 RX ADMIN — ONDANSETRON PRN MG: 2 INJECTION INTRAMUSCULAR; INTRAVENOUS at 10:16

## 2019-06-02 RX ADMIN — FOLIC ACID SCH: 1 TABLET ORAL at 07:02

## 2019-06-02 NOTE — PN
PROGRESS NOTE



SUBJECTIVE:

68-year-old white male with left-sided kidney stone going to have right urostomy tube

placed today as his creatinine went from 3-6.



Cardiovascular:  S1-S2.  Lungs transmitted upper airway sounds.  Hematology negative

Homans.  Psych fair mood and affect.



ASSESSMENT:

1. Suspect ureteral stenosis with left side renal stone.

2. Nephrolithiasis.

3. Acute on chronic renal failure.

4. Ureteral stenosis.  Stent may be placed.

Follow up in next 24 to 48 hours.





MMODL / IJN: [id number] / Job#: [id number]

## 2019-06-02 NOTE — P.OP
Date of Procedure: 06/02/19


Preoperative Diagnosis: 


Left ureteral calculus, left hydronephrosis, renal failure


Postoperative Diagnosis: 


Same


Procedure(s) Performed: 


Cystoscopy, left retrograde pyelogram, placement of double-J catheter 6 x 24 

left,


Anesthesia: ANGEL


Surgeon: Bill Coto


Pathology: none sent


Condition: stable


Disposition: PACU


Indications for Procedure: 


The patient is 68.  He has congenital spinal deformities, barrel chesting COPD 

and mental handicap.  He came in with obstruction from a left ureteral calculus.

 He had a 4 mm stone in the upper to mid ureter on the left.  He has 

hydronephrosis.  His creatinine is gone from normal to 8 over the last couple of

days a.  His bladder is emptying adequately.  Best we can tell the right kidney 

is not functioning adequately and therefore double-J catheter be placed on the 

left at minimum to relieve the obstruction.


Description of Procedure: 


The patient is brought to the operating suite.  He's placed on the operating 

table supine position.  With care from anesthesia a successful general 

endotracheal anesthesia is maintained.  Fortunately through relaxation his 

contracted strategies is straightened adequately such that he can be placed in 

dorsal lithotomy position.  Cystoscopy a Foroblique lens and 22-Yoruba sheath 

identifies a normal urethra.  The prostate is trilobar with an intravesical 

obstructing middle lobe.  Upon entering the bladder there are multiple small 

stones are drained out of the bladder.  I then inspect the bladder and in its 

entirety.  There is a diverticulum in the right lateral wall with a medium to 

large mouth and a small to medium size.  The left ureteral orifice is normal.  I

cannot identify the right ureteral orifice.  The diverticula is more lateral 

than I would expect the orifice to be if it was a hutch diverticula.





Within a cone-tipped catheter a left retrograde pyelogram was performed.  There 

is marked J hooking of the distal ureter.  I see a stone that is obstructing the

ureter and the proximal ureter.  Stone is very high thus I elect not to proceed 

with the ureteroscopy at this point in time.  I attempted to place an 035 

through the ureter but due to the marked J hooking of the ureter unable to do 

so.  I introduced a semirigid ureteroscope into the ureteral orifice and then am

able to intubate the ureter with an 035 wire that passes into the kidney.  The 

stone appears to dislodge and fall back into the kidney.  Over the wires and 

passed a 6 x 24 double-J catheter that coils in the renal pelvis and the bladder

the bladder strain the patient's awake and returned recovery room good condition





Impression relief of obstructed left ureter due to stone.  Plan the patient 

renal failure will hopefully resolve.  A later date after the ureters dilated 

he'll need a flexible ureteroscopy to assess the left collecting system and 

remove any remaining stone.

## 2019-06-02 NOTE — PN
PROGRESS NOTE



Patient is seen for followup for acute kidney injury, appears to be mostly obstructed

in nature.  Serum creatinine did go up to 5.7 from 2.9 yesterday and today it is up at

8.0.  The patient was seen by Dr. Coto again yesterday and he is scheduled for surgery

today.  This morning he denies any significant complaints except for nausea for which

patient has received Zofran, which he was initially refusing.



Blood pressure this morning 122/60, heart rate 85 per minute.  He is afebrile.

Examination of the heart S1, S2.  Examination of lungs bilateral breath sounds are

heard.  Abdomen is soft, nontender.  Examination lower extremities shows no significant

edema.  Patient has severe kyphoscoliosis.



LABS:

Show sodium 139, potassium 5.5, chloride 113, CO2 is 18, BUN 61, serum creatinine 8.04.



ASSESSMENT:

1. Acute kidney injury, obstructive in nature with evidence of left ureteral stone.

    Right kidney was not adequately visualized.  Patient is going for a double-J

    catheter placement today.

2. Hyperkalemia associated with acute kidney injury and obstructive uropathy.  We will

    treat medically.

3. Metabolic acidosis secondary to progressive renal failure.  Expect improvement with

    improving urinary tract obstruction.



PLAN:

Continue IV fluids.  Treat hyperkalemia with insulin and D50.  Repeat potassium this

evening.  Continue to avoid nephrotoxic agents and repeat labs in a.m.





MMODL / IJN: [id number] / Job#: [id number]

## 2019-06-02 NOTE — FL
EXAMINATION TYPE: FL urography retrograde

 

DATE OF EXAM: 6/2/2019

 

COMPARISON: NONE

 

HISTORY: Fluoroscopic documentation of retrograde urography

 

TECHNIQUE: Fluoroscopy.

 

FINDINGS:  Fluoroscopic guidance was provided during procedure performed by Dr. Coto.  A total of 41
 seconds of fluoroscopic time was utilized during the procedure and 2 spot images was acquired demons
trating opacification of the solitary ureter and ureteral stent partially visualized.

 

IMPRESSION:  As Above.

## 2019-06-02 NOTE — P.PN
Progress Note - Text


Progress Note Date: 06/02/19





Unable to see patient as he is currently in the OR.

## 2019-06-03 LAB
ANION GAP SERPL CALC-SCNC: 7 MMOL/L
BASOPHILS # BLD AUTO: 0 K/UL (ref 0–0.2)
BASOPHILS NFR BLD AUTO: 0 %
BUN SERPL-SCNC: 43 MG/DL (ref 9–20)
CALCIUM SPEC-MCNC: 8.8 MG/DL (ref 8.4–10.2)
CHLORIDE SERPL-SCNC: 115 MMOL/L (ref 98–107)
CO2 SERPL-SCNC: 22 MMOL/L (ref 22–30)
EOSINOPHIL # BLD AUTO: 0.2 K/UL (ref 0–0.7)
EOSINOPHIL NFR BLD AUTO: 2 %
ERYTHROCYTE [DISTWIDTH] IN BLOOD BY AUTOMATED COUNT: 4.62 M/UL (ref 4.3–5.9)
ERYTHROCYTE [DISTWIDTH] IN BLOOD: 16.1 % (ref 11.5–15.5)
GLUCOSE SERPL-MCNC: 89 MG/DL (ref 74–99)
HCT VFR BLD AUTO: 32.3 % (ref 39–53)
HGB BLD-MCNC: 9.4 GM/DL (ref 13–17.5)
LYMPHOCYTES # SPEC AUTO: 0.5 K/UL (ref 1–4.8)
LYMPHOCYTES NFR SPEC AUTO: 6 %
MCH RBC QN AUTO: 20.3 PG (ref 25–35)
MCHC RBC AUTO-ENTMCNC: 29 G/DL (ref 31–37)
MCV RBC AUTO: 69.9 FL (ref 80–100)
MONOCYTES # BLD AUTO: 0.6 K/UL (ref 0–1)
MONOCYTES NFR BLD AUTO: 7 %
NEUTROPHILS # BLD AUTO: 7 K/UL (ref 1.3–7.7)
NEUTROPHILS NFR BLD AUTO: 84 %
PLATELET # BLD AUTO: 149 K/UL (ref 150–450)
POTASSIUM SERPL-SCNC: 5.6 MMOL/L (ref 3.5–5.1)
SODIUM SERPL-SCNC: 144 MMOL/L (ref 137–145)
WBC # BLD AUTO: 8.3 K/UL (ref 3.8–10.6)

## 2019-06-03 RX ADMIN — CYANOCOBALAMIN TAB 500 MCG SCH MCG: 500 TAB at 07:20

## 2019-06-03 RX ADMIN — PANTOPRAZOLE SODIUM SCH MG: 40 TABLET, DELAYED RELEASE ORAL at 07:20

## 2019-06-03 RX ADMIN — Medication SCH MG: at 07:20

## 2019-06-03 RX ADMIN — FOLIC ACID SCH MG: 1 TABLET ORAL at 07:19

## 2019-06-03 RX ADMIN — CEFAZOLIN SCH MLS/HR: 330 INJECTION, POWDER, FOR SOLUTION INTRAMUSCULAR; INTRAVENOUS at 04:34

## 2019-06-03 RX ADMIN — CEFAZOLIN SCH MLS/HR: 330 INJECTION, POWDER, FOR SOLUTION INTRAMUSCULAR; INTRAVENOUS at 16:22

## 2019-06-03 NOTE — XR
Abdomen

 

HISTORY: Indwelling double-J stent, kidney stone

 

Frontal view of the abdomen correlated to prior abdomen dated 5/31/2019

 

Double-J left-sided ureteral stent is in place. There is a calcification adjacent to the proximal asp
ect of the double-J stent. Multiple calcifications are present within the pelvis. There is likely an 
underlying scoliosis. Lung bases no evident pneumoperitoneum or bowel obstruction.

 

IMPRESSION: Indwelling double-J stent associated calcifications as described. Show a similar appearan
ce to prior exam.

## 2019-06-03 NOTE — P.PN
Subjective


Progress Note Date: 06/03/19


Principal diagnosis: 


Hypoxemia related to hypoventilation, severe degree of kyphoscoliosis, 

hypercapnic hypoxic respiratory failure, renal calculi left, obstructive 

uropathy with left-sided hydronephrosis, urinary tract colic with left ureteric 

calculi








06/03/2019, patient seen and evaluated examined during the rounds he is doing 

better he is status post a stent placement denies any chest pain he is breathing

comfortably he remains on oxygen





This is a 68-year-old male seen and evaluated examined in the medical floor this

patient is admitted to hospital with left ureteric calculi she was identified as

having left ureteral 4 mm stones off note that patient has severe extensive 

kyphoscoliosis he have borderline shortness of breath patient is currently 

denies any shortness of breath or chest pain symptoms improve however her room 

air saturation was just 93%, he require 2 L oxygen denies any cough or sputum 

production denies any chest pain denies any shortness of breath at this time








Objective





- Vital Signs


Vital signs: 


                                   Vital Signs











Temp  97.5 F L  06/03/19 14:09


 


Pulse  108 H  06/03/19 14:09


 


Resp  18   06/03/19 14:09


 


BP  128/57   06/03/19 14:09


 


Pulse Ox  95   06/03/19 14:09








                                 Intake & Output











 06/02/19 06/03/19 06/03/19





 18:59 06:59 18:59


 


Intake Total 1400  540


 


Output Total 0  


 


Balance 1400  540


 


Weight   43.091 kg


 


Intake:   


 


  IV 1400  


 


  Oral   540


 


Output:   


 


  Estimated Blood Loss 0  


 


Other:   


 


  Voiding Method   Urinal





   Incontinent


 


  # Voids 1 3 6


 


  # Bowel Movements   1














- Exam





- Constitutional


General appearance: average body habitus, cooperative, no acute distress





- EENT


Eyes: EOMI, PERRLA


Ears: bilateral: normal





- Neck


Neck: normal ROM


Carotids: bilateral: upstroke normal





- Respiratory





Severe kyphoscoliosis present


Respiratory: bilateral: CTA





- Cardiovascular


Rhythm: regular


Heart sounds: normal: S1, S2





- Gastrointestinal


General gastrointestinal: normal bowel sounds





- Neurologic


Neurologic: CNII-XII intact





- Musculoskeletal


Musculoskeletal: generalized weakness, strength equal bilaterally





- Psychiatric


Psychiatric: A&O x's 3, appropriate affect, intact judgment & insight











- Labs


CBC & Chem 7: 


                                 06/03/19 09:23





                                 06/03/19 16:17


Labs: 


                  Abnormal Lab Results - Last 24 Hours (Table)











  06/02/19 06/02/19 06/03/19 Range/Units





  17:54 18:46 09:23 


 


Hgb    9.4 L  (13.0-17.5)  gm/dL


 


Hct    32.3 L  (39.0-53.0)  %


 


MCV    69.9 L  (80.0-100.0)  fL


 


MCH    20.3 L  (25.0-35.0)  pg


 


MCHC    29.0 L  (31.0-37.0)  g/dL


 


RDW    16.1 H  (11.5-15.5)  %


 


Plt Count    149 L  (150-450)  k/uL


 


Lymphocytes #    0.5 L  (1.0-4.8)  k/uL


 


Potassium     (3.5-5.1)  mmol/L


 


Chloride   112 H   ()  mmol/L


 


Carbon Dioxide   18 L   (22-30)  mmol/L


 


BUN   53 H   (9-20)  mg/dL


 


Creatinine   5.43 H   (0.66-1.25)  mg/dL


 


Glucose   132 H   (74-99)  mg/dL


 


POC Glucose (mg/dL)  117 H    (75-99)  mg/dL


 


Calcium   8.2 L   (8.4-10.2)  mg/dL


 


AST   16 L   (17-59)  U/L


 


ALT   19 L   (21-72)  U/L


 


Total Protein   6.1 L   (6.3-8.2)  g/dL














  06/03/19 Range/Units





  09:23 


 


Hgb   (13.0-17.5)  gm/dL


 


Hct   (39.0-53.0)  %


 


MCV   (80.0-100.0)  fL


 


MCH   (25.0-35.0)  pg


 


MCHC   (31.0-37.0)  g/dL


 


RDW   (11.5-15.5)  %


 


Plt Count   (150-450)  k/uL


 


Lymphocytes #   (1.0-4.8)  k/uL


 


Potassium  5.6 H  (3.5-5.1)  mmol/L


 


Chloride  115 H  ()  mmol/L


 


Carbon Dioxide   (22-30)  mmol/L


 


BUN  43 H  (9-20)  mg/dL


 


Creatinine  3.21 H  (0.66-1.25)  mg/dL


 


Glucose   (74-99)  mg/dL


 


POC Glucose (mg/dL)   (75-99)  mg/dL


 


Calcium   (8.4-10.2)  mg/dL


 


AST   (17-59)  U/L


 


ALT   (21-72)  U/L


 


Total Protein   (6.3-8.2)  g/dL














Assessment and Plan


Assessment: 





Hypoxia related to hypoventilation





Renal calculi left





Acute renal failure related to above





Left renal hydronephrosis





Suspect chronic hypoxia and chronic hypercapnia likely chronic hypercapnic 

hypoxic respiratory failure


Plan: 





Reviewed arterial blood gases at supplemental oxygen





For now keep supplemental oxygen





Monitor renal functions closely





May need BiPAP machine or noninvasive ventilator as outpatient, for now patient 

is refusing BiPAP machine


Time with Patient: Greater than 30

## 2019-06-03 NOTE — P.PN
Subjective


Progress Note Date: 06/03/19





The patient is in his first postoperative day from placement of a double-J 

catheter left.  His creatinine is gone from 83.  Vital signs are stable.  I'll 

obtain a KUB as I suspect the stone migrated back into the kidney during the 

stent placement.  This will help me decide how to treat in the future.





Objective





- Vital Signs


Vital signs: 


                                   Vital Signs











Temp  98.7 F   06/03/19 06:46


 


Pulse  105 H  06/03/19 06:46


 


Resp  18   06/03/19 06:46


 


BP  158/78   06/03/19 06:46


 


Pulse Ox  96   06/03/19 06:46








                                 Intake & Output











 06/02/19 06/03/19 06/03/19





 18:59 06:59 18:59


 


Intake Total 1400  


 


Output Total 0  


 


Balance 1400  


 


Intake:   


 


  IV 1400  


 


Output:   


 


  Estimated Blood Loss 0  


 


Other:   


 


  # Voids 1 3 














- Labs


CBC & Chem 7: 


                                 06/03/19 09:23





                                 06/03/19 09:23


Labs: 


                  Abnormal Lab Results - Last 24 Hours (Table)











  06/02/19 06/02/19 06/03/19 Range/Units





  17:54 18:46 09:23 


 


Hgb    9.4 L  (13.0-17.5)  gm/dL


 


Hct    32.3 L  (39.0-53.0)  %


 


MCV    69.9 L  (80.0-100.0)  fL


 


MCH    20.3 L  (25.0-35.0)  pg


 


MCHC    29.0 L  (31.0-37.0)  g/dL


 


RDW    16.1 H  (11.5-15.5)  %


 


Plt Count    149 L  (150-450)  k/uL


 


Lymphocytes #    0.5 L  (1.0-4.8)  k/uL


 


Potassium     (3.5-5.1)  mmol/L


 


Chloride   112 H   ()  mmol/L


 


Carbon Dioxide   18 L   (22-30)  mmol/L


 


BUN   53 H   (9-20)  mg/dL


 


Creatinine   5.43 H   (0.66-1.25)  mg/dL


 


Glucose   132 H   (74-99)  mg/dL


 


POC Glucose (mg/dL)  117 H    (75-99)  mg/dL


 


Calcium   8.2 L   (8.4-10.2)  mg/dL


 


AST   16 L   (17-59)  U/L


 


ALT   19 L   (21-72)  U/L


 


Total Protein   6.1 L   (6.3-8.2)  g/dL














  06/03/19 Range/Units





  09:23 


 


Hgb   (13.0-17.5)  gm/dL


 


Hct   (39.0-53.0)  %


 


MCV   (80.0-100.0)  fL


 


MCH   (25.0-35.0)  pg


 


MCHC   (31.0-37.0)  g/dL


 


RDW   (11.5-15.5)  %


 


Plt Count   (150-450)  k/uL


 


Lymphocytes #   (1.0-4.8)  k/uL


 


Potassium  5.6 H  (3.5-5.1)  mmol/L


 


Chloride  115 H  ()  mmol/L


 


Carbon Dioxide   (22-30)  mmol/L


 


BUN  43 H  (9-20)  mg/dL


 


Creatinine  3.21 H  (0.66-1.25)  mg/dL


 


Glucose   (74-99)  mg/dL


 


POC Glucose (mg/dL)   (75-99)  mg/dL


 


Calcium   (8.4-10.2)  mg/dL


 


AST   (17-59)  U/L


 


ALT   (21-72)  U/L


 


Total Protein   (6.3-8.2)  g/dL

## 2019-06-03 NOTE — P.PN
Subjective


Progress Note Date: 06/03/19


this is a 68-year-old gentleman with left-sided kidney stone, nephrolithiasis, 

acute on chronic renal failure and multiple other medical issues.  Evaluated by 

urology.  Status post cystoscopy with left pyelogram and double J catheter 

placement. Tolerated the procedure well. Creatinine improved, 3.28. Nauseated, 

potassium 5.6,receiving insulin and D50 for hyperkalemia.denies chest pain, 

palpitations or increased shortness of breath.








Objective





- Vital Signs


Vital signs: 


                                   Vital Signs











Temp  99.1 F   06/02/19 14:51


 


Pulse  118 H  06/02/19 21:54


 


Resp  17   06/02/19 21:54


 


BP  123/51   06/02/19 21:54


 


Pulse Ox  91 L  06/02/19 21:54








                                 Intake & Output











 06/02/19 06/03/19 06/03/19





 18:59 06:59 18:59


 


Intake Total 1400  


 


Output Total 0  


 


Balance 1400  


 


Intake:   


 


  IV 1400  


 


Output:   


 


  Estimated Blood Loss 0  


 


Other:   


 


  # Voids 1 3 














- Exam


PHYSICAL EXAM:


VITAL SIGNS: [as above]


GENERAL: sitting up at bedside, no acute distress


HEENT:  Conjunctivae normal. eyes normal. 


NECK:  No JVD. No thyroid enlargement. No LNs


CARDIOVASCULAR:  S1, S2 regular.. No murmur


RESPIRATION: Breath sounds diminished in the bases. No rhonchi or crackles. No 

bronchial breathing.


ABDOMEN:  Soft,  nontender . No guarding. no masses palpable. Bowel sounds 

heard.


LEGS:  No edema. no swelling 


PSYCHIATRY: Alert and oriented -3, mood and affect normal.


NERVOUS SYSTEM:  Cranial N 2-12 grossly normal. Moves all 4 limbs.  Diffuse 

weakness No focal deficits.  Strength and sensation grossly intact..


Skin: no lesions, no rash 


Joints: No active swelling. No inflammation.


Lymphatic system. No LN neck axilla or groin.











- Labs


CBC & Chem 7: 


                                 06/03/19 09:23





                                 06/03/19 09:23


Labs: 


                  Abnormal Lab Results - Last 24 Hours (Table)











  06/02/19 06/02/19 06/02/19 Range/Units





  11:22 11:22 17:54 


 


Hgb  9.3 L    (13.0-17.5)  gm/dL


 


Hct  31.6 L    (39.0-53.0)  %


 


MCV  69.7 L    (80.0-100.0)  fL


 


MCH  20.5 L    (25.0-35.0)  pg


 


MCHC  29.4 L    (31.0-37.0)  g/dL


 


RDW  16.0 H    (11.5-15.5)  %


 


Plt Count  131 L    (150-450)  k/uL


 


Lymphocytes #  0.7 L    (1.0-4.8)  k/uL


 


Potassium   5.5 H   (3.5-5.1)  mmol/L


 


Chloride   113 H   ()  mmol/L


 


Carbon Dioxide   18 L   (22-30)  mmol/L


 


BUN   61 H   (9-20)  mg/dL


 


Creatinine   8.04 H*   (0.66-1.25)  mg/dL


 


Glucose     (74-99)  mg/dL


 


POC Glucose (mg/dL)    117 H  (75-99)  mg/dL


 


Calcium   8.0 L   (8.4-10.2)  mg/dL


 


AST   12 L   (17-59)  U/L


 


ALT   18 L   (21-72)  U/L


 


Total Protein   5.7 L   (6.3-8.2)  g/dL


 


Albumin   3.2 L   (3.5-5.0)  g/dL














  06/02/19 Range/Units





  18:46 


 


Hgb   (13.0-17.5)  gm/dL


 


Hct   (39.0-53.0)  %


 


MCV   (80.0-100.0)  fL


 


MCH   (25.0-35.0)  pg


 


MCHC   (31.0-37.0)  g/dL


 


RDW   (11.5-15.5)  %


 


Plt Count   (150-450)  k/uL


 


Lymphocytes #   (1.0-4.8)  k/uL


 


Potassium   (3.5-5.1)  mmol/L


 


Chloride  112 H  ()  mmol/L


 


Carbon Dioxide  18 L  (22-30)  mmol/L


 


BUN  53 H  (9-20)  mg/dL


 


Creatinine  5.43 H  (0.66-1.25)  mg/dL


 


Glucose  132 H  (74-99)  mg/dL


 


POC Glucose (mg/dL)   (75-99)  mg/dL


 


Calcium  8.2 L  (8.4-10.2)  mg/dL


 


AST  16 L  (17-59)  U/L


 


ALT  19 L  (21-72)  U/L


 


Total Protein  6.1 L  (6.3-8.2)  g/dL


 


Albumin   (3.5-5.0)  g/dL














Assessment and Plan


Assessment: 


acute renal failure, secondary to ATN secondary to obstructive uropathy, status 

post cystoscopy with left ureteral stent placement


-left-sided nephrolithiasis with hydronephrosis, status post double-J ureteral 

stent placement


-hyperkalemia secondary to renal failure


-Congenital spinal deformity with severe scoliosis














plan: Continue on current medication regime ,monitoring and symptomatic 

treatment.  Receiving D50,bicarb and regular IV insulinfor hyperkalemia, repeat 

potassium this afternoon.  IV fluids as per nephrology.KUB ordered as per 

urology.  Close monitoring of renal function, electrolytes with repeat labs 

ordered for a.m.














The impression and plan of care has been dictated as directed.





:


I performed a history and examination of this patient,  discussed the same with 

the dictator.  I agree with the dictator's note ,documented as a scribe.  Any 

additional findings or plans will be noted.

## 2019-06-03 NOTE — P.PN
Subjective





Patient is seen in follow-up for acute kidney injury.  Unknown baseline renal 

function.  Creatinine peaked at 8.04 this admission and is down to 3.1 today.  

Etiology is obstructive uropathy.  Patient had a cystoscopy done with a left 

ureteral stent placed on June 2.  He admits to good urine output.  Oral intake 

is fair.  No vomiting or diarrhea.





Vital signs are stable.


General: The patient appeared well nourished and normally developed. 


HEENT: Head exam is unremarkable. Neck is without jugular venous distension.


LUNGS: Lungs are clear to auscultation and percussion. Breath sounds decreased.


HEART: Rate and Rhythm are regular. First and second heart sounds normal. No 

murmurs, rubs or gallops. 


ABDOMEN: Abdominal exam reveals normal bowel sounds. Non-tender and non-

distended. No evidence of peritonitis.


EXTREMITITES: No clubbing, cyanosis, or edema.





Objective





- Vital Signs


Vital signs: 


                                   Vital Signs











Temp  98.7 F   06/03/19 06:46


 


Pulse  105 H  06/03/19 06:46


 


Resp  18   06/03/19 06:46


 


BP  158/78   06/03/19 06:46


 


Pulse Ox  96   06/03/19 06:46








                                 Intake & Output











 06/02/19 06/03/19 06/03/19





 18:59 06:59 18:59


 


Intake Total 1400  


 


Output Total 0  


 


Balance 1400  


 


Intake:   


 


  IV 1400  


 


Output:   


 


  Estimated Blood Loss 0  


 


Other:   


 


  # Voids 1 3 














- Labs


CBC & Chem 7: 


                                 06/03/19 09:23





                                 06/03/19 09:23


Labs: 


                  Abnormal Lab Results - Last 24 Hours (Table)











  06/02/19 06/02/19 06/02/19 Range/Units





  11:22 11:22 17:54 


 


Hgb  9.3 L    (13.0-17.5)  gm/dL


 


Hct  31.6 L    (39.0-53.0)  %


 


MCV  69.7 L    (80.0-100.0)  fL


 


MCH  20.5 L    (25.0-35.0)  pg


 


MCHC  29.4 L    (31.0-37.0)  g/dL


 


RDW  16.0 H    (11.5-15.5)  %


 


Plt Count  131 L    (150-450)  k/uL


 


Lymphocytes #  0.7 L    (1.0-4.8)  k/uL


 


Potassium   5.5 H   (3.5-5.1)  mmol/L


 


Chloride   113 H   ()  mmol/L


 


Carbon Dioxide   18 L   (22-30)  mmol/L


 


BUN   61 H   (9-20)  mg/dL


 


Creatinine   8.04 H*   (0.66-1.25)  mg/dL


 


Glucose     (74-99)  mg/dL


 


POC Glucose (mg/dL)    117 H  (75-99)  mg/dL


 


Calcium   8.0 L   (8.4-10.2)  mg/dL


 


AST   12 L   (17-59)  U/L


 


ALT   18 L   (21-72)  U/L


 


Total Protein   5.7 L   (6.3-8.2)  g/dL


 


Albumin   3.2 L   (3.5-5.0)  g/dL














  06/02/19 06/03/19 06/03/19 Range/Units





  18:46 09:23 09:23 


 


Hgb   9.4 L   (13.0-17.5)  gm/dL


 


Hct   32.3 L   (39.0-53.0)  %


 


MCV   69.9 L   (80.0-100.0)  fL


 


MCH   20.3 L   (25.0-35.0)  pg


 


MCHC   29.0 L   (31.0-37.0)  g/dL


 


RDW   16.1 H   (11.5-15.5)  %


 


Plt Count   149 L   (150-450)  k/uL


 


Lymphocytes #   0.5 L   (1.0-4.8)  k/uL


 


Potassium    5.6 H  (3.5-5.1)  mmol/L


 


Chloride  112 H   115 H  ()  mmol/L


 


Carbon Dioxide  18 L    (22-30)  mmol/L


 


BUN  53 H   43 H  (9-20)  mg/dL


 


Creatinine  5.43 H   3.21 H  (0.66-1.25)  mg/dL


 


Glucose  132 H    (74-99)  mg/dL


 


POC Glucose (mg/dL)     (75-99)  mg/dL


 


Calcium  8.2 L    (8.4-10.2)  mg/dL


 


AST  16 L    (17-59)  U/L


 


ALT  19 L    (21-72)  U/L


 


Total Protein  6.1 L    (6.3-8.2)  g/dL


 


Albumin     (3.5-5.0)  g/dL














Assessment and Plan


Plan: 





Assessment:


1.  Acute kidney injury secondary to ATN secondary to obstructive uropathy.  

Creatinine peaked at 8.04 this admission and is down to 2.1 today.  Unknown 

baseline renal function.


2.  Left-sided nephrolithiasis with hydronephrosis status post ureteral stent 

placed on June 2.


3.  Pyuria.  Urine culture negative so far.


4.  Congenital spinal deformity.  Severe scoliosis.


5.  Hyperkalemia secondary to acute kidney injury and metabolic acidosis.





Plan:


I will decrease the rate of normal saline to 75 mL an hour.


10 units of IV insulin with an amp of D50 now.


2 A of bicarb IV push.


Repeat potassium level this evening.


Encourage oral intake.


Repeat electrolytes in the morning.

## 2019-06-04 VITALS — RESPIRATION RATE: 20 BRPM | TEMPERATURE: 98.4 F

## 2019-06-04 LAB
ALBUMIN SERPL-MCNC: 2.8 G/DL (ref 3.5–5)
ALP SERPL-CCNC: 50 U/L (ref 38–126)
ALT SERPL-CCNC: 24 U/L (ref 21–72)
ANION GAP SERPL CALC-SCNC: 11 MMOL/L
ANION GAP SERPL CALC-SCNC: 11 MMOL/L
APTT BLD: 21.8 SEC (ref 22–30)
AST SERPL-CCNC: 31 U/L (ref 17–59)
BUN SERPL-SCNC: 41 MG/DL (ref 9–20)
BUN SERPL-SCNC: 55 MG/DL (ref 9–20)
CALCIUM SPEC-MCNC: 7.1 MG/DL (ref 8.4–10.2)
CALCIUM SPEC-MCNC: 9 MG/DL (ref 8.4–10.2)
CELLS COUNTED: 100
CHLORIDE SERPL-SCNC: 111 MMOL/L (ref 98–107)
CHLORIDE SERPL-SCNC: 113 MMOL/L (ref 98–107)
CO2 BLDA-SCNC: 25 MMOL/L (ref 19–24)
CO2 SERPL-SCNC: 18 MMOL/L (ref 22–30)
CO2 SERPL-SCNC: 20 MMOL/L (ref 22–30)
ERYTHROCYTE [DISTWIDTH] IN BLOOD BY AUTOMATED COUNT: 3.79 M/UL (ref 4.3–5.9)
ERYTHROCYTE [DISTWIDTH] IN BLOOD: 16.4 % (ref 11.5–15.5)
GLUCOSE BLD-MCNC: 150 MG/DL (ref 75–99)
GLUCOSE SERPL-MCNC: 109 MG/DL (ref 74–99)
GLUCOSE SERPL-MCNC: 123 MG/DL (ref 74–99)
GLUCOSE UR QL: (no result)
HCO3 BLDA-SCNC: 24 MMOL/L (ref 21–25)
HCT VFR BLD AUTO: 27.1 % (ref 39–53)
HGB BLD-MCNC: 9 GM/DL (ref 13–17.5)
INR PPP: 1.1 (ref ?–1.2)
KETONES UR QL STRIP.AUTO: (no result)
LYMPHOCYTES # BLD MANUAL: 0.2 K/UL (ref 1–4.8)
MAGNESIUM SPEC-SCNC: 1.6 MG/DL (ref 1.6–2.3)
MAGNESIUM SPEC-SCNC: 2 MG/DL (ref 1.6–2.3)
MCH RBC QN AUTO: 23.7 PG (ref 25–35)
MCHC RBC AUTO-ENTMCNC: 33.1 G/DL (ref 31–37)
MCV RBC AUTO: 71.5 FL (ref 80–100)
NEUTROPHILS NFR BLD MANUAL: 97 %
NEUTS SEG # BLD MANUAL: 9.9 K/UL (ref 1.3–7.7)
PCO2 BLDA: 53 MMHG (ref 35–45)
PH BLDA: 7.25 [PH] (ref 7.35–7.45)
PH UR: 5.5 [PH] (ref 5–8)
PLATELET # BLD AUTO: 134 K/UL (ref 150–450)
PO2 BLDA: 88 MMHG (ref 83–108)
POTASSIUM SERPL-SCNC: 4.5 MMOL/L (ref 3.5–5.1)
POTASSIUM SERPL-SCNC: 5.8 MMOL/L (ref 3.5–5.1)
PROT SERPL-MCNC: 5.2 G/DL (ref 6.3–8.2)
PROT UR QL: (no result)
PT BLD: 11.9 SEC (ref 9–12)
RBC UR QL: >182 /HPF (ref 0–5)
SODIUM SERPL-SCNC: 140 MMOL/L (ref 137–145)
SODIUM SERPL-SCNC: 144 MMOL/L (ref 137–145)
SP GR UR: 1.02 (ref 1–1.03)
TROPONIN I SERPL-MCNC: 0.03 NG/ML (ref 0–0.03)
UROBILINOGEN UR QL STRIP: <2 MG/DL (ref ?–2)
WBC # BLD AUTO: 10.2 K/UL (ref 3.8–10.6)

## 2019-06-04 PROCEDURE — 5A1935Z RESPIRATORY VENTILATION, LESS THAN 24 CONSECUTIVE HOURS: ICD-10-PCS

## 2019-06-04 PROCEDURE — 04HK33Z INSERTION OF INFUSION DEVICE INTO RIGHT FEMORAL ARTERY, PERCUTANEOUS APPROACH: ICD-10-PCS

## 2019-06-04 PROCEDURE — 5A12012 PERFORMANCE OF CARDIAC OUTPUT, SINGLE, MANUAL: ICD-10-PCS

## 2019-06-04 PROCEDURE — 0D9670Z DRAINAGE OF STOMACH WITH DRAINAGE DEVICE, VIA NATURAL OR ARTIFICIAL OPENING: ICD-10-PCS

## 2019-06-04 PROCEDURE — 0BH17EZ INSERTION OF ENDOTRACHEAL AIRWAY INTO TRACHEA, VIA NATURAL OR ARTIFICIAL OPENING: ICD-10-PCS

## 2019-06-04 RX ADMIN — CEFAZOLIN SCH: 330 INJECTION, POWDER, FOR SOLUTION INTRAMUSCULAR; INTRAVENOUS at 05:38

## 2019-06-04 RX ADMIN — PANTOPRAZOLE SODIUM SCH MG: 40 TABLET, DELAYED RELEASE ORAL at 08:38

## 2019-06-04 RX ADMIN — Medication SCH MG: at 08:38

## 2019-06-04 RX ADMIN — ONDANSETRON PRN MG: 2 INJECTION INTRAMUSCULAR; INTRAVENOUS at 02:29

## 2019-06-04 RX ADMIN — CEFAZOLIN SCH: 330 INJECTION, POWDER, FOR SOLUTION INTRAMUSCULAR; INTRAVENOUS at 09:17

## 2019-06-04 RX ADMIN — TAMSULOSIN HYDROCHLORIDE PRN MG: 0.4 CAPSULE ORAL at 08:40

## 2019-06-04 RX ADMIN — FOLIC ACID SCH MG: 1 TABLET ORAL at 08:38

## 2019-06-04 RX ADMIN — CYANOCOBALAMIN TAB 500 MCG SCH MCG: 500 TAB at 08:38

## 2019-06-04 NOTE — P.PN
Subjective


Progress Note Date: 06/04/19


Principal diagnosis: 


Aspiration pneumonia, cardiac arrest likely respiratory related to hypercapnic 

hypoxic respiratory failure, worsening sepsis due to renal failure and 

nonfunctioning stent, worsening of renal functions impaired to yesterday 

Hypoxemia related to hypoventilation, severe degree of kyphoscoliosis, 

hypercapnic hypoxic respiratory failure, renal calculi left, obstructive 

uropathy with left-sided hydronephrosis, urinary tract colic with left ureteric 

calculi





06/04/2019, patient seen eval reexamined during the rounds patient seen in the 

ICU he is intubated with size 6 ET tube resting on ventilator he is on propofol 

40 mics hemodynamic status stable not on any vasopressors blood pressure is 

stable oxygenation stable his ventilator setting includes assist control rate of

20 breathing 20 tidal volume 300, PEEP 5, 100% oxygen, peak airway pressure is 

33 the ABG results reviewed postintubation also chest x-ray reviewed 

postintubation there is a large infiltrate seen upper lobe was not there in 

previous x-rays suggestive of aspiration pneumonia, events from this evening 

noted patient underwent asystole likely related to respiratory arrest related to

hypercapnia, patient was recommended to use BiPAP he declined he remains on 

supplemental oxygen required more oxygen to keep a stable oxygenation 

saturation, patient underwent ACLS protocol immediately got pulses back, he did 

require epinephrine and chest compression, labs are reviewed, white cell count i

s 10,200, hemoglobin is 9, MCV 71, arterial blood gas revealed postintubation pH

7.25 pCO2 53 pO2 of 88, renal functions revealed BUN/creatinine of 41 and 2.86 

which were down from earlier this morning of 55 and 5.84, urine continued to 

show enlarged and nitrite remained cloudy with moderate leukocyte esterase 

trace, cultures are negative in urine, patient is being evaluated placement in 

extended care facility for advanced renal failure and complexity of the 

procedure








06/03/2019, patient seen and evaluated examined during the rounds he is doing 

better he is status post a stent placement denies any chest pain he is breathing

comfortably he remains on oxygen





This is a 68-year-old male seen and evaluated examined in the medical floor this

patient is admitted to hospital with left ureteric calculi she was identified as

having left ureteral 4 mm stones off note that patient has severe extensive 

kyphoscoliosis he have borderline shortness of breath patient is currently 

denies any shortness of breath or chest pain symptoms improve however her room 

air saturation was just 93%, he require 2 L oxygen denies any cough or sputum 

production denies any chest pain denies any shortness of breath at this time








Objective





- Vital Signs


Vital signs: 


                                   Vital Signs











Temp  99.2 F   06/04/19 19:00


 


Pulse  99   06/04/19 19:00


 


Resp  23   06/04/19 19:00


 


BP  113/83   06/04/19 19:00


 


Pulse Ox  98   06/04/19 19:00








                                 Intake & Output











 06/04/19 06/04/19 06/05/19





 06:59 18:59 06:59


 


Intake Total  100 75.646


 


Output Total  951 450


 


Balance  -851 -374.354


 


Intake:   


 


  IV   75


 


    Sodium Chloride 0.9% 1,   75





    000 ml @ 75 mls/hr IV .   





    [id number] SANDRA Rx#:[id number]   


 


  Intake, IV Titration   0.646





  Amount   


 


    Propofol 1,000 mg In   0.646





    Empty Bag 1 bag @ Titrate   





    IV .Q0M SANDRA Rx#:   





    [id number]   


 


  Oral  100 


 


Output:   


 


  Urine  500 450


 


    Straight  500 


 


  Post Void Residual  451 


 


Other:   


 


  Voiding Method Urinal Urinal 





 Incontinent Incontinent 


 


  # Voids 1 0 


 


  # Bowel Movements  1 














- Exam





- Constitutional


General appearance: sedated with propofol- EENT


Eyes: EOMI, PERRLA


Ears: bilateral: normal





- Neck


Neck: normal ROM


Carotids: bilateral: upstroke normal





- Respiratory





Severe kyphoscoliosis present


Respiratory: Intubated with size 6 endotracheal tube bilateral: Coarse breath 

sound bilaterally





- Cardiovascular


Rhythm: regular


Heart sounds: normal: S1, S2





- Gastrointestinal


General gastrointestinal: normal bowel sounds





- Neurologic


Neurologic: CNII-XII intact





- Musculoskeletal


Musculoskeletal: generalized weakness, strength equal bilaterally





- Psychiatric


Psychiatric: Sedated with propofol











- Labs


CBC & Chem 7: 


                                 06/04/19 19:50





                                 06/04/19 19:50


Labs: 


                  Abnormal Lab Results - Last 24 Hours (Table)











  06/04/19 06/04/19 06/04/19 Range/Units





  11:53 18:37 19:10 


 


RBC     (4.30-5.90)  m/uL


 


Hgb     (13.0-17.5)  gm/dL


 


Hct     (39.0-53.0)  %


 


MCV     (80.0-100.0)  fL


 


MCH     (25.0-35.0)  pg


 


RDW     (11.5-15.5)  %


 


Plt Count     (150-450)  k/uL


 


Neutrophils # (Manual)     (1.3-7.7)  k/uL


 


Lymphocytes # (Manual)     (1.0-4.8)  k/uL


 


ABG pH    7.25 L  (7.35-7.45)  


 


ABG pCO2    53 H  (35-45)  mmHg


 


ABG Total CO2    25 H  (19-24)  mmol/L


 


Potassium  5.8 H    (3.5-5.1)  mmol/L


 


Chloride  113 H    ()  mmol/L


 


Carbon Dioxide  20 L    (22-30)  mmol/L


 


BUN  55 H    (9-20)  mg/dL


 


Creatinine  5.84 H    (0.66-1.25)  mg/dL


 


Glucose  109 H    (74-99)  mg/dL


 


POC Glucose (mg/dL)   150 H   (75-99)  mg/dL


 


Calcium     (8.4-10.2)  mg/dL


 


CK-MB (CK-2)     (0.0-2.4)  ng/mL


 


Total Protein     (6.3-8.2)  g/dL


 


Albumin     (3.5-5.0)  g/dL


 


Urine Protein     (Negative)  


 


Urine Glucose (UA)     (Negative)  


 


Urine Ketones     (Negative)  


 


Urine Blood     (Negative)  


 


Ur Leukocyte Esterase     (Negative)  


 


Urine RBC     (0-5)  /hpf


 


Urine WBC     (0-5)  /hpf














  06/04/19 06/04/19 06/04/19 Range/Units





  19:20 19:50 19:50 


 


RBC   3.79 L   (4.30-5.90)  m/uL


 


Hgb   9.0 L   (13.0-17.5)  gm/dL


 


Hct   27.1 L   (39.0-53.0)  %


 


MCV   71.5 L   (80.0-100.0)  fL


 


MCH   23.7 L   (25.0-35.0)  pg


 


RDW   16.4 H   (11.5-15.5)  %


 


Plt Count   134 L   (150-450)  k/uL


 


Neutrophils # (Manual)   9.90 H   (1.3-7.7)  k/uL


 


Lymphocytes # (Manual)   0.20 L   (1.0-4.8)  k/uL


 


ABG pH     (7.35-7.45)  


 


ABG pCO2     (35-45)  mmHg


 


ABG Total CO2     (19-24)  mmol/L


 


Potassium     (3.5-5.1)  mmol/L


 


Chloride    111 H  ()  mmol/L


 


Carbon Dioxide    18 L  (22-30)  mmol/L


 


BUN    41 H  (9-20)  mg/dL


 


Creatinine    2.86 H  (0.66-1.25)  mg/dL


 


Glucose    123 H  (74-99)  mg/dL


 


POC Glucose (mg/dL)     (75-99)  mg/dL


 


Calcium    7.1 L  (8.4-10.2)  mg/dL


 


CK-MB (CK-2)     (0.0-2.4)  ng/mL


 


Total Protein    5.2 L  (6.3-8.2)  g/dL


 


Albumin    2.8 L  (3.5-5.0)  g/dL


 


Urine Protein  1+ H    (Negative)  


 


Urine Glucose (UA)  1+ H    (Negative)  


 


Urine Ketones  1+ H    (Negative)  


 


Urine Blood  Large H    (Negative)  


 


Ur Leukocyte Esterase  Moderate H    (Negative)  


 


Urine RBC  >182 H    (0-5)  /hpf


 


Urine WBC  30 H    (0-5)  /hpf














  06/04/19 Range/Units





  19:50 


 


RBC   (4.30-5.90)  m/uL


 


Hgb   (13.0-17.5)  gm/dL


 


Hct   (39.0-53.0)  %


 


MCV   (80.0-100.0)  fL


 


MCH   (25.0-35.0)  pg


 


RDW   (11.5-15.5)  %


 


Plt Count   (150-450)  k/uL


 


Neutrophils # (Manual)   (1.3-7.7)  k/uL


 


Lymphocytes # (Manual)   (1.0-4.8)  k/uL


 


ABG pH   (7.35-7.45)  


 


ABG pCO2   (35-45)  mmHg


 


ABG Total CO2   (19-24)  mmol/L


 


Potassium   (3.5-5.1)  mmol/L


 


Chloride   ()  mmol/L


 


Carbon Dioxide   (22-30)  mmol/L


 


BUN   (9-20)  mg/dL


 


Creatinine   (0.66-1.25)  mg/dL


 


Glucose   (74-99)  mg/dL


 


POC Glucose (mg/dL)   (75-99)  mg/dL


 


Calcium   (8.4-10.2)  mg/dL


 


CK-MB (CK-2)  5.6 H  (0.0-2.4)  ng/mL


 


Total Protein   (6.3-8.2)  g/dL


 


Albumin   (3.5-5.0)  g/dL


 


Urine Protein   (Negative)  


 


Urine Glucose (UA)   (Negative)  


 


Urine Ketones   (Negative)  


 


Urine Blood   (Negative)  


 


Ur Leukocyte Esterase   (Negative)  


 


Urine RBC   (0-5)  /hpf


 


Urine WBC   (0-5)  /hpf














Assessment and Plan


Assessment: 


Cardiopulmonary arrest likely respiratory related due to hypercapnic respiratory

failure





Aspiration pneumonia





Hypoxia related to hypoventilation





Renal calculi left





Acute renal failure related to above





Left renal hydronephrosis





Severe kyphoscoliosis





Suspect chronic hypoxia and chronic hypercapnia likely chronic hypercapnic 

hypoxic respiratory failure


Plan: 





Reviewed arterial blood gases at supplemental oxygen and labs





Continue vent setting as mentioned above





We'll switch antibiotics to Zosyn





Repeat x-ray and labs and arterial blood gas tomorrow





Patient is being considered for transfer to [facility] for more 

definitive intervention for left hydronephrosis and left ureter calculi causing 

obstructive uropathy and left hydro-nephrosis








Critical care as per time spent 35 minutes














Time with Patient: Greater than 30

## 2019-06-04 NOTE — P.PN
Subjective


Progress Note Date: 06/04/19


this is a 68-year-old gentleman with left-sided kidney stone, nephrolithiasis, 

acute on chronic renal failure and multiple other medical issues.  Evaluated by 

urology.  Status post cystoscopy with left pyelogram and double J catheter 

placement. Tolerated the procedure well. Creatinine improved, 3.28. Nauseated, 

potassium 5.6,receiving insulin and D50 for hyperkalemia.denies chest pain, 

palpitations or increased shortness of breath.











06/04/2019 KUB reporting calcification adjacent to stent.  Creatinine worsening 

of to 5.84.  Potassium 5.8.  Patient declining IV fluids.  Reports pain, 

declines pain medication.  Nauseated.  Denies chest pain, palpitations or 

shortness of breath.  T-max 99.3..








Objective





- Vital Signs


Vital signs: 


                                   Vital Signs











Temp  99.3 F   06/04/19 06:52


 


Pulse  110 H  06/04/19 06:52


 


Resp  20   06/04/19 06:52


 


BP  133/83   06/04/19 06:52


 


Pulse Ox  92 L  06/04/19 06:52








                                 Intake & Output











 06/03/19 06/04/19 06/04/19





 18:59 06:59 18:59


 


Intake Total 1080  100


 


Balance 1080  100


 


Weight 43.091 kg  


 


Intake:   


 


  Oral 1080  100


 


Other:   


 


  Voiding Method Urinal Urinal Urinal





 Incontinent Incontinent Incontinent


 


  # Voids 3 1 


 


  # Bowel Movements 1  1














- Exam


PHYSICAL EXAM:


VITAL SIGNS: [as above]


GENERAL: sitting up at bedside, no acute distress


HEENT:  Conjunctivae normal. eyes normal. 


NECK:  No JVD. No thyroid enlargement. No LNs


CARDIOVASCULAR:  S1, S2 regular.. No murmur


RESPIRATION: Breath sounds diminished in the bases. No rhonchi or crackles. No 

bronchial breathing.


ABDOMEN:  Soft,  nontender . No guarding. no masses palpable. Bowel sounds 

heard.


LEGS:  No edema. no swelling 


PSYCHIATRY: Alert and oriented -3, mood and affect normal.


NERVOUS SYSTEM:  Cranial N 2-12 grossly normal. Moves all 4 limbs.  Diffuse 

weakness No focal deficits.  Strength and sensation grossly intact..


Skin: no lesions, no rash 


Joints: No active swelling. No inflammation.


Lymphatic system. No LN neck axilla or groin.











- Labs


CBC & Chem 7: 


                                 06/03/19 09:23





                                 06/04/19 11:53


Labs: 


                  Abnormal Lab Results - Last 24 Hours (Table)











  06/04/19 Range/Units





  11:53 


 


Potassium  5.8 H  (3.5-5.1)  mmol/L


 


Chloride  113 H  ()  mmol/L


 


Carbon Dioxide  20 L  (22-30)  mmol/L


 


BUN  55 H  (9-20)  mg/dL


 


Creatinine  5.84 H  (0.66-1.25)  mg/dL


 


Glucose  109 H  (74-99)  mg/dL














Assessment and Plan


Assessment: 


acute renal failure, secondary to ATN secondary to obstructive uropathy, status 

post cystoscopy with left ureteral stent placement


-left-sided nephrolithiasis with hydronephrosis, status post double-J ureteral 

stent placement


-hyperkalemia secondary to renal failure


-Congenital spinal deformity with severe scoliosis














plan: Continue on current medication regime ,monitoring and symptomatic 

treatment.  Further recommendations from urology pending.hyperkalemia cocktail 

as per nephrology, repeat potassium level this afternoon.  IV fluids as per 

nephrology.Close monitoring of renal function, electrolytes with repeat labs 

ordered for a.m.














The impression and plan of care has been dictated as directed.





:


I performed a history and examination of this patient,  discussed the same with 

the dictator.  I agree with the dictator's note ,documented as a scribe.  Any 

additional findings or plans will be noted.

## 2019-06-04 NOTE — XR
EXAMINATION: XR chest 1V portable

DATE AND TIME:  6/4/2019 7:17 PM

 

CLINICAL INDICATION: PHH; Tube placement

 

TECHNIQUE: Portable AP semiupright

 

COMPARISON: None

 

FINDINGS: Prominent architectural distortion involving the upper chest and mid chest.

 

ET tube tip appears to be superimposed over the distal trachea, but foreshortening of the thoracic st
ructures limits visualization.

 

NG tube is present, with its port superimposed over the expected position of the gastric body.

 

There is dense consolidation throughout the left lung parenchyma, consistent with a clinical diagnosi
s of pneumonia.

.

No definite pneumothorax or other abnormal gas collection is seen. However, it is noted that there is
 markedly limited radiographic sensitivity for abnormal gas collections in this radiographic setting.


 

IMPRESSION: 

1. Postintubation chest radiograph.

2. Dense consolidation throughout the left lung parenchyma.

 

Note: Would suggest low threshold for use of CT imaging.

## 2019-06-04 NOTE — P.PN
Subjective





Patient is seen in follow-up for acute kidney injury.  Unknown baseline renal 

function.  Creatinine peaked at 8.04 this admission and was down to 3.1 as of 

yesterday.  Etiology is obstructive uropathy.  Patient had a cystoscopy done 

with a left ureteral stent placed on June 2.  He admits to good urine output.  

Oral intake is fair.  Feels nauseous this morning.  He's been dry heaving.





Vital signs are stable.


General: The patient appeared well nourished and normally developed. 


HEENT: Head exam is unremarkable. Neck is without jugular venous distension.


LUNGS: Lungs are clear to auscultation and percussion. Breath sounds decreased.


HEART: Rate and Rhythm are regular. First and second heart sounds normal. No 

murmurs, rubs or gallops. 


ABDOMEN: Abdominal exam reveals normal bowel sounds. Non-tender and non-

distended. No evidence of peritonitis.


EXTREMITITES: No clubbing, cyanosis, or edema.





Objective





- Vital Signs


Vital signs: 


                                   Vital Signs











Temp  99.3 F   06/04/19 06:52


 


Pulse  110 H  06/04/19 06:52


 


Resp  20   06/04/19 06:52


 


BP  133/83   06/04/19 06:52


 


Pulse Ox  92 L  06/04/19 06:52








                                 Intake & Output











 06/03/19 06/04/19 06/04/19





 18:59 06:59 18:59


 


Intake Total 1080  100


 


Balance 1080  100


 


Weight 43.091 kg  


 


Intake:   


 


  Oral 1080  100


 


Other:   


 


  Voiding Method Urinal Urinal Urinal





 Incontinent Incontinent Incontinent


 


  # Voids 3 1 


 


  # Bowel Movements 1  1














- Labs


CBC & Chem 7: 


                                 06/03/19 09:23





                                 06/03/19 16:17





Assessment and Plan


Plan: 





Assessment:


1.  Acute kidney injury secondary to ATN secondary to obstructive uropathy.  

Creatinine peaked at 8.04 this admission and was down to 3.21 as of yesterday.  

Unknown baseline renal function.


2.  Left-sided nephrolithiasis with hydronephrosis status post ureteral stent 

placed on June 2.


3.  Pyuria.  Urine culture negative so far.


4.  Congenital spinal deformity.  Severe scoliosis.


5.  Hyperkalemia secondary to acute kidney injury and metabolic acidosis.





Plan:


Maintain normal saline at 75 mL an hour.


Morning labs pending.

## 2019-06-05 VITALS — SYSTOLIC BLOOD PRESSURE: 129 MMHG | DIASTOLIC BLOOD PRESSURE: 79 MMHG | HEART RATE: 95 BPM

## 2019-06-06 NOTE — CDI
Documentation Clarification Form



Date: 6/6/2019 

From: Mony Moyer

Phone: If questions call Tere Jerez @ [phone], Hours-8:30 am & 5 pm M-
F

MRN: [id number]

Admit Date: 5/30/2019 9:05:00 PM

Patient Name: Slava Sultana

Visit Number: [id number]

Discharge Date:  6/5/2019 8:11:00 AM



ATTENTION: The Clinical Documentation Specialists (CDI) and [facility] Coding Staff 
appreciate your assistance in clarifying documentation. Please respond to the 
clarification below the line at the bottom and electronically sign. The CDI & 
[facility] Coding staff will review the response and follow-up if needed. Please note: 
Queries are made part of the Legal Health Record. If you have any questions, 
please contact the author of this message via ITS.



Dr. Frank Franco



Per nephrology consult there was pyuria. Acute kdney injury secondary to ATN 
secondary to infection. Urine cultures were no growth.



History/Risk Factors: left nephrolithiasis w hydronephrosis, congential 
scoliosis

Lab findings:  WBC 6.4-10.2; Neutrophils 4.8-7.0

Vital Signs: T-98.0, P-87/112, R-20/26, BP-109/59-93/62, O2 sat-90/83

Treatment: IV Rocephin started on 6/1

Procedure: cystoscopy, left retrograde pyelogram, placement of left double J 
catheter



In your professional opinion, can you please clarify the cause of the pyuria?

        UTI ruled in --POA

        UTI ruled in - not POA

        UTI ruled out

        Other, please specify ________

        Unable to determine

___________________________________________________________________________

MTDD

## 2019-06-07 NOTE — DS
DISCHARGE SUMMARY



ADDENDUM TO PROGRESS NOTE AND DISCHARGE SUMMARY



Please add: Chronic colonization, acute kidney injury secondary acute tubular necrosis

and possibly colonization as the cause of the pyuria.  No active urinary tract

infection.





MMODL / IJN: [id number] / Job#: [id number]

## 2019-06-10 NOTE — DS
DISCHARGE SUMMARY



DATE OF ADMISSION:

05/30/2019



DATE OF DISCHARGE:

06/05/2019



DISCHARGE MEDICATIONS:

1. Norco 5/325 one to two q.4.

2. Flomax 0.4 mg daily.

3. Folic acid 0.4 mg daily.

4. Iron sulfate 325 daily.

5. B12, 1000 mcg daily.

6. Zofran 8 mg q.8 hours.



CONDITION:

Stable.



PROGNOSIS:

Guarded.



Ambulate as tolerated.



HOSPITAL COURSE OF EVENTS:

This 68-year-old white male had acute kidney injury and colonization cause of the

pyuria.  No urinary tract infection was seen.  Chronic kidney disease stage 4 was seen.

The patient underwent acute urostomy to fix acute kidney injury, urostomy _____ or

urostomy stenosis per Dr. Coto.  The patient developed respiratory distress, worse

than normal, for which Dr. Rodriguez has been seeing the patient.  He has also been seen by

renal physician.



He had severe degree of kyphoscoliosis and hypoxemia due to hypoventilation hypercapnic

hypoxic respiratory failure, renal calculi, obstructive uropathy, as mentioned above.

The patient became severely short of breath using 15 L.  He was intubated for

respiratory distress by Dr. Rodriguez for unclear reasons.  The patient's renal failure was

worsening, status post urostomy tubes.  He was sent _____for ureteral obstruction due

to urostomy stent failure down to [facility] as he was stabilized on the ventilator per

Dr. Rodriguez prior to transfer.  Please see further orders.





MMODL / IJN: [id number] / Job#: [id number]